# Patient Record
Sex: FEMALE | Race: WHITE | Employment: FULL TIME | URBAN - METROPOLITAN AREA
[De-identification: names, ages, dates, MRNs, and addresses within clinical notes are randomized per-mention and may not be internally consistent; named-entity substitution may affect disease eponyms.]

---

## 2019-08-07 RX ORDER — LANCETS 33 GAUGE
EACH MISCELLANEOUS
COMMUNITY
Start: 2015-11-09

## 2019-08-07 RX ORDER — ASPIRIN 81 MG/1
81 TABLET ORAL EVERY EVENING
Status: ON HOLD | COMMUNITY
Start: 2008-07-02 | End: 2019-08-13

## 2019-08-07 RX ORDER — CITALOPRAM HYDROBROMIDE 40 MG/1
20 TABLET ORAL EVERY EVENING
COMMUNITY
Start: 2019-06-11

## 2019-08-07 RX ORDER — COVID-19 ANTIGEN TEST
220 KIT MISCELLANEOUS 2 TIMES DAILY WITH MEALS
COMMUNITY

## 2019-08-07 RX ORDER — LOSARTAN POTASSIUM 25 MG/1
25 TABLET ORAL EVERY EVENING
COMMUNITY
Start: 2019-02-14

## 2019-08-07 RX ORDER — MAGNESIUM OXIDE 400 MG/1
400 TABLET ORAL EVERY EVENING
COMMUNITY
Start: 2019-02-14

## 2019-08-07 RX ORDER — CYCLOBENZAPRINE HCL 10 MG
10 TABLET ORAL 3 TIMES DAILY PRN
COMMUNITY
Start: 2019-03-04

## 2019-08-07 RX ORDER — HYDROCHLOROTHIAZIDE 25 MG/1
25 TABLET ORAL DAILY
COMMUNITY
Start: 2019-06-12

## 2019-08-07 RX ORDER — LEVOTHYROXINE SODIUM 112 UG/1
112 TABLET ORAL DAILY
COMMUNITY
Start: 2019-02-14

## 2019-08-07 RX ORDER — ATORVASTATIN CALCIUM 40 MG/1
40 TABLET, FILM COATED ORAL AT BEDTIME
COMMUNITY
Start: 2019-02-14

## 2019-08-09 ENCOUNTER — ANESTHESIA EVENT (OUTPATIENT)
Dept: SURGERY | Facility: CLINIC | Age: 69
DRG: 470 | End: 2019-08-09
Payer: MEDICARE

## 2019-08-12 ENCOUNTER — ANESTHESIA (OUTPATIENT)
Dept: SURGERY | Facility: CLINIC | Age: 69
DRG: 470 | End: 2019-08-12
Payer: MEDICARE

## 2019-08-12 ENCOUNTER — HOSPITAL ENCOUNTER (INPATIENT)
Facility: CLINIC | Age: 69
LOS: 2 days | Discharge: HOME | DRG: 470 | End: 2019-08-14
Attending: ORTHOPAEDIC SURGERY | Admitting: ORTHOPAEDIC SURGERY
Payer: MEDICARE

## 2019-08-12 DIAGNOSIS — Z96.651 STATUS POST TOTAL RIGHT KNEE REPLACEMENT: Primary | ICD-10-CM

## 2019-08-12 PROBLEM — M17.11 RIGHT KNEE DJD: Status: ACTIVE | Noted: 2019-08-12

## 2019-08-12 LAB
CREAT SERPL-MCNC: 1.04 MG/DL (ref 0.52–1.04)
GFR SERPL CREATININE-BSD FRML MDRD: 55 ML/MIN/{1.73_M2}
GLUCOSE BLDC GLUCOMTR-MCNC: 134 MG/DL (ref 70–99)
GLUCOSE BLDC GLUCOMTR-MCNC: 136 MG/DL (ref 70–99)
GLUCOSE BLDC GLUCOMTR-MCNC: 274 MG/DL (ref 70–99)
GLUCOSE BLDC GLUCOMTR-MCNC: 331 MG/DL (ref 70–99)
PLATELET # BLD AUTO: 240 10E9/L (ref 150–450)

## 2019-08-12 PROCEDURE — 27110028 ZZH OR GENERAL SUPPLY NON-STERILE: Performed by: ORTHOPAEDIC SURGERY

## 2019-08-12 PROCEDURE — 36000093 ZZH SURGERY LEVEL 4 1ST 30 MIN: Performed by: ORTHOPAEDIC SURGERY

## 2019-08-12 PROCEDURE — 85049 AUTOMATED PLATELET COUNT: CPT | Performed by: ORTHOPAEDIC SURGERY

## 2019-08-12 PROCEDURE — C1776 JOINT DEVICE (IMPLANTABLE): HCPCS | Performed by: ORTHOPAEDIC SURGERY

## 2019-08-12 PROCEDURE — 71000012 ZZH RECOVERY PHASE 1 LEVEL 1 FIRST HR: Performed by: ORTHOPAEDIC SURGERY

## 2019-08-12 PROCEDURE — 0SRC0J9 REPLACEMENT OF RIGHT KNEE JOINT WITH SYNTHETIC SUBSTITUTE, CEMENTED, OPEN APPROACH: ICD-10-PCS | Performed by: ORTHOPAEDIC SURGERY

## 2019-08-12 PROCEDURE — 12000000 ZZH R&B MED SURG/OB

## 2019-08-12 PROCEDURE — 25800030 ZZH RX IP 258 OP 636: Performed by: NURSE ANESTHETIST, CERTIFIED REGISTERED

## 2019-08-12 PROCEDURE — 36415 COLL VENOUS BLD VENIPUNCTURE: CPT | Performed by: ORTHOPAEDIC SURGERY

## 2019-08-12 PROCEDURE — 37000008 ZZH ANESTHESIA TECHNICAL FEE, 1ST 30 MIN: Performed by: ORTHOPAEDIC SURGERY

## 2019-08-12 PROCEDURE — 40000306 ZZH STATISTIC PRE PROC ASSESS II: Performed by: ORTHOPAEDIC SURGERY

## 2019-08-12 PROCEDURE — 37000009 ZZH ANESTHESIA TECHNICAL FEE, EACH ADDTL 15 MIN: Performed by: ORTHOPAEDIC SURGERY

## 2019-08-12 PROCEDURE — 25000128 H RX IP 250 OP 636: Performed by: PHYSICIAN ASSISTANT

## 2019-08-12 PROCEDURE — 25000128 H RX IP 250 OP 636: Performed by: ORTHOPAEDIC SURGERY

## 2019-08-12 PROCEDURE — 27810169 ZZH OR IMPLANT GENERAL: Performed by: ORTHOPAEDIC SURGERY

## 2019-08-12 PROCEDURE — 25000128 H RX IP 250 OP 636: Performed by: NURSE ANESTHETIST, CERTIFIED REGISTERED

## 2019-08-12 PROCEDURE — 25000125 ZZHC RX 250: Performed by: NURSE ANESTHETIST, CERTIFIED REGISTERED

## 2019-08-12 PROCEDURE — 27210794 ZZH OR GENERAL SUPPLY STERILE: Performed by: ORTHOPAEDIC SURGERY

## 2019-08-12 PROCEDURE — 25000132 ZZH RX MED GY IP 250 OP 250 PS 637: Mod: GY | Performed by: PHYSICIAN ASSISTANT

## 2019-08-12 PROCEDURE — 25000132 ZZH RX MED GY IP 250 OP 250 PS 637: Mod: GY | Performed by: ORTHOPAEDIC SURGERY

## 2019-08-12 PROCEDURE — 25800030 ZZH RX IP 258 OP 636: Performed by: ORTHOPAEDIC SURGERY

## 2019-08-12 PROCEDURE — 00000146 ZZHCL STATISTIC GLUCOSE BY METER IP

## 2019-08-12 PROCEDURE — 82565 ASSAY OF CREATININE: CPT | Performed by: ORTHOPAEDIC SURGERY

## 2019-08-12 PROCEDURE — 36000063 ZZH SURGERY LEVEL 4 EA 15 ADDTL MIN: Performed by: ORTHOPAEDIC SURGERY

## 2019-08-12 DEVICE — BONE CEMENT DEPUY FAST SET 20GM CMW2: Type: IMPLANTABLE DEVICE | Site: KNEE | Status: FUNCTIONAL

## 2019-08-12 DEVICE — IMPLANTABLE DEVICE: Type: IMPLANTABLE DEVICE | Site: KNEE | Status: FUNCTIONAL

## 2019-08-12 DEVICE — IMP COMP FEM JJ ATTUNE CR RT CEM SZ5 150400205: Type: IMPLANTABLE DEVICE | Site: KNEE | Status: FUNCTIONAL

## 2019-08-12 RX ORDER — AMOXICILLIN 250 MG
1 CAPSULE ORAL 2 TIMES DAILY
Status: DISCONTINUED | OUTPATIENT
Start: 2019-08-12 | End: 2019-08-14 | Stop reason: HOSPADM

## 2019-08-12 RX ORDER — ACETAMINOPHEN 325 MG/1
650 TABLET ORAL EVERY 4 HOURS PRN
Status: DISCONTINUED | OUTPATIENT
Start: 2019-08-15 | End: 2019-08-14 | Stop reason: HOSPADM

## 2019-08-12 RX ORDER — EPINEPHRINE 1 MG/ML
INJECTION, SOLUTION, CONCENTRATE INTRAVENOUS PRN
Status: DISCONTINUED | OUTPATIENT
Start: 2019-08-12 | End: 2019-08-12

## 2019-08-12 RX ORDER — GLUCOSAMINE HCL/CHONDROITIN SU 500-400 MG
CAPSULE ORAL 3 TIMES DAILY
COMMUNITY

## 2019-08-12 RX ORDER — NALOXONE HYDROCHLORIDE 0.4 MG/ML
.1-.4 INJECTION, SOLUTION INTRAMUSCULAR; INTRAVENOUS; SUBCUTANEOUS
Status: DISCONTINUED | OUTPATIENT
Start: 2019-08-12 | End: 2019-08-12

## 2019-08-12 RX ORDER — CITALOPRAM HYDROBROMIDE 20 MG/1
20 TABLET ORAL EVERY EVENING
Status: DISCONTINUED | OUTPATIENT
Start: 2019-08-12 | End: 2019-08-14 | Stop reason: HOSPADM

## 2019-08-12 RX ORDER — HYDROMORPHONE HYDROCHLORIDE 1 MG/ML
.3-.5 INJECTION, SOLUTION INTRAMUSCULAR; INTRAVENOUS; SUBCUTANEOUS
Status: DISCONTINUED | OUTPATIENT
Start: 2019-08-12 | End: 2019-08-14 | Stop reason: HOSPADM

## 2019-08-12 RX ORDER — EPHEDRINE SULFATE 50 MG/ML
INJECTION, SOLUTION INTRAMUSCULAR; INTRAVENOUS; SUBCUTANEOUS PRN
Status: DISCONTINUED | OUTPATIENT
Start: 2019-08-12 | End: 2019-08-12

## 2019-08-12 RX ORDER — SODIUM CHLORIDE, SODIUM LACTATE, POTASSIUM CHLORIDE, CALCIUM CHLORIDE 600; 310; 30; 20 MG/100ML; MG/100ML; MG/100ML; MG/100ML
INJECTION, SOLUTION INTRAVENOUS CONTINUOUS
Status: DISCONTINUED | OUTPATIENT
Start: 2019-08-12 | End: 2019-08-12 | Stop reason: HOSPADM

## 2019-08-12 RX ORDER — FENTANYL CITRATE 50 UG/ML
25-50 INJECTION, SOLUTION INTRAMUSCULAR; INTRAVENOUS
Status: DISCONTINUED | OUTPATIENT
Start: 2019-08-12 | End: 2019-08-12 | Stop reason: HOSPADM

## 2019-08-12 RX ORDER — PROPOFOL 10 MG/ML
INJECTION, EMULSION INTRAVENOUS CONTINUOUS PRN
Status: DISCONTINUED | OUTPATIENT
Start: 2019-08-12 | End: 2019-08-12

## 2019-08-12 RX ORDER — SODIUM CHLORIDE, SODIUM LACTATE, POTASSIUM CHLORIDE, CALCIUM CHLORIDE 600; 310; 30; 20 MG/100ML; MG/100ML; MG/100ML; MG/100ML
INJECTION, SOLUTION INTRAVENOUS CONTINUOUS
Status: DISCONTINUED | OUTPATIENT
Start: 2019-08-12 | End: 2019-08-13

## 2019-08-12 RX ORDER — ONDANSETRON 2 MG/ML
4 INJECTION INTRAMUSCULAR; INTRAVENOUS EVERY 30 MIN PRN
Status: DISCONTINUED | OUTPATIENT
Start: 2019-08-12 | End: 2019-08-12 | Stop reason: HOSPADM

## 2019-08-12 RX ORDER — BUPIVACAINE HYDROCHLORIDE 7.5 MG/ML
INJECTION, SOLUTION INTRASPINAL PRN
Status: DISCONTINUED | OUTPATIENT
Start: 2019-08-12 | End: 2019-08-12

## 2019-08-12 RX ORDER — HYDROCHLOROTHIAZIDE 25 MG/1
25 TABLET ORAL DAILY
Status: DISCONTINUED | OUTPATIENT
Start: 2019-08-13 | End: 2019-08-14 | Stop reason: HOSPADM

## 2019-08-12 RX ORDER — LIDOCAINE HYDROCHLORIDE 10 MG/ML
INJECTION, SOLUTION INFILTRATION; PERINEURAL PRN
Status: DISCONTINUED | OUTPATIENT
Start: 2019-08-12 | End: 2019-08-12

## 2019-08-12 RX ORDER — LEVOTHYROXINE SODIUM 112 UG/1
112 TABLET ORAL DAILY
Status: DISCONTINUED | OUTPATIENT
Start: 2019-08-13 | End: 2019-08-14 | Stop reason: HOSPADM

## 2019-08-12 RX ORDER — ACETAMINOPHEN 325 MG/1
975 TABLET ORAL EVERY 8 HOURS
Status: DISCONTINUED | OUTPATIENT
Start: 2019-08-12 | End: 2019-08-14 | Stop reason: HOSPADM

## 2019-08-12 RX ORDER — LIDOCAINE HYDROCHLORIDE AND EPINEPHRINE BITARTRATE 20; .01 MG/ML; MG/ML
INJECTION, SOLUTION SUBCUTANEOUS PRN
Status: DISCONTINUED | OUTPATIENT
Start: 2019-08-12 | End: 2019-08-12

## 2019-08-12 RX ORDER — KETOROLAC TROMETHAMINE 30 MG/ML
INJECTION, SOLUTION INTRAMUSCULAR; INTRAVENOUS PRN
Status: DISCONTINUED | OUTPATIENT
Start: 2019-08-12 | End: 2019-08-12

## 2019-08-12 RX ORDER — AMOXICILLIN 250 MG
2 CAPSULE ORAL 2 TIMES DAILY
Status: DISCONTINUED | OUTPATIENT
Start: 2019-08-12 | End: 2019-08-14 | Stop reason: HOSPADM

## 2019-08-12 RX ORDER — ONDANSETRON 4 MG/1
4 TABLET, ORALLY DISINTEGRATING ORAL EVERY 6 HOURS PRN
Status: DISCONTINUED | OUTPATIENT
Start: 2019-08-12 | End: 2019-08-14 | Stop reason: HOSPADM

## 2019-08-12 RX ORDER — ONDANSETRON 4 MG/1
4 TABLET, ORALLY DISINTEGRATING ORAL EVERY 30 MIN PRN
Status: DISCONTINUED | OUTPATIENT
Start: 2019-08-12 | End: 2019-08-12 | Stop reason: HOSPADM

## 2019-08-12 RX ORDER — KETAMINE HYDROCHLORIDE 10 MG/ML
INJECTION, SOLUTION INTRAMUSCULAR; INTRAVENOUS PRN
Status: DISCONTINUED | OUTPATIENT
Start: 2019-08-12 | End: 2019-08-12

## 2019-08-12 RX ORDER — LIDOCAINE 40 MG/G
CREAM TOPICAL
Status: DISCONTINUED | OUTPATIENT
Start: 2019-08-12 | End: 2019-08-14 | Stop reason: HOSPADM

## 2019-08-12 RX ORDER — ATORVASTATIN CALCIUM 20 MG/1
40 TABLET, FILM COATED ORAL AT BEDTIME
Status: DISCONTINUED | OUTPATIENT
Start: 2019-08-12 | End: 2019-08-14 | Stop reason: HOSPADM

## 2019-08-12 RX ORDER — DIMENHYDRINATE 50 MG/ML
25 INJECTION, SOLUTION INTRAMUSCULAR; INTRAVENOUS
Status: DISCONTINUED | OUTPATIENT
Start: 2019-08-12 | End: 2019-08-12 | Stop reason: HOSPADM

## 2019-08-12 RX ORDER — DEXTROSE MONOHYDRATE 25 G/50ML
25-50 INJECTION, SOLUTION INTRAVENOUS
Status: DISCONTINUED | OUTPATIENT
Start: 2019-08-12 | End: 2019-08-14 | Stop reason: HOSPADM

## 2019-08-12 RX ORDER — ROPIVACAINE HYDROCHLORIDE 5 MG/ML
INJECTION, SOLUTION EPIDURAL; INFILTRATION; PERINEURAL PRN
Status: DISCONTINUED | OUTPATIENT
Start: 2019-08-12 | End: 2019-08-12

## 2019-08-12 RX ORDER — CEFAZOLIN SODIUM 2 G/100ML
2 INJECTION, SOLUTION INTRAVENOUS EVERY 8 HOURS
Status: COMPLETED | OUTPATIENT
Start: 2019-08-12 | End: 2019-08-13

## 2019-08-12 RX ORDER — DEXAMETHASONE SODIUM PHOSPHATE 4 MG/ML
4 INJECTION, SOLUTION INTRA-ARTICULAR; INTRALESIONAL; INTRAMUSCULAR; INTRAVENOUS; SOFT TISSUE
Status: DISCONTINUED | OUTPATIENT
Start: 2019-08-12 | End: 2019-08-12 | Stop reason: HOSPADM

## 2019-08-12 RX ORDER — HYDROXYZINE HYDROCHLORIDE 50 MG/1
50 TABLET, FILM COATED ORAL EVERY 6 HOURS PRN
Status: DISCONTINUED | OUTPATIENT
Start: 2019-08-12 | End: 2019-08-12 | Stop reason: HOSPADM

## 2019-08-12 RX ORDER — CEFAZOLIN SODIUM 2 G/100ML
2 INJECTION, SOLUTION INTRAVENOUS
Status: COMPLETED | OUTPATIENT
Start: 2019-08-12 | End: 2019-08-12

## 2019-08-12 RX ORDER — LIDOCAINE 40 MG/G
CREAM TOPICAL
Status: DISCONTINUED | OUTPATIENT
Start: 2019-08-12 | End: 2019-08-12 | Stop reason: HOSPADM

## 2019-08-12 RX ORDER — DEXAMETHASONE SODIUM PHOSPHATE 4 MG/ML
INJECTION, SOLUTION INTRA-ARTICULAR; INTRALESIONAL; INTRAMUSCULAR; INTRAVENOUS; SOFT TISSUE PRN
Status: DISCONTINUED | OUTPATIENT
Start: 2019-08-12 | End: 2019-08-12

## 2019-08-12 RX ORDER — PROCHLORPERAZINE MALEATE 5 MG
5 TABLET ORAL EVERY 6 HOURS PRN
Status: DISCONTINUED | OUTPATIENT
Start: 2019-08-12 | End: 2019-08-14 | Stop reason: HOSPADM

## 2019-08-12 RX ORDER — GABAPENTIN 100 MG/1
100 CAPSULE ORAL 3 TIMES DAILY
Status: DISCONTINUED | OUTPATIENT
Start: 2019-08-12 | End: 2019-08-14 | Stop reason: HOSPADM

## 2019-08-12 RX ORDER — HYDROXYZINE HYDROCHLORIDE 25 MG/1
25 TABLET, FILM COATED ORAL EVERY 6 HOURS PRN
Status: DISCONTINUED | OUTPATIENT
Start: 2019-08-12 | End: 2019-08-12 | Stop reason: HOSPADM

## 2019-08-12 RX ORDER — CEFAZOLIN SODIUM 1 G/3ML
1 INJECTION, POWDER, FOR SOLUTION INTRAMUSCULAR; INTRAVENOUS SEE ADMIN INSTRUCTIONS
Status: DISCONTINUED | OUTPATIENT
Start: 2019-08-12 | End: 2019-08-12 | Stop reason: HOSPADM

## 2019-08-12 RX ORDER — NALOXONE HYDROCHLORIDE 0.4 MG/ML
.1-.4 INJECTION, SOLUTION INTRAMUSCULAR; INTRAVENOUS; SUBCUTANEOUS
Status: DISCONTINUED | OUTPATIENT
Start: 2019-08-12 | End: 2019-08-14 | Stop reason: HOSPADM

## 2019-08-12 RX ORDER — MEPERIDINE HYDROCHLORIDE 25 MG/ML
12.5 INJECTION INTRAMUSCULAR; INTRAVENOUS; SUBCUTANEOUS EVERY 5 MIN PRN
Status: DISCONTINUED | OUTPATIENT
Start: 2019-08-12 | End: 2019-08-12 | Stop reason: HOSPADM

## 2019-08-12 RX ORDER — GABAPENTIN 100 MG/1
100 CAPSULE ORAL
Status: COMPLETED | OUTPATIENT
Start: 2019-08-12 | End: 2019-08-12

## 2019-08-12 RX ORDER — ONDANSETRON 2 MG/ML
4 INJECTION INTRAMUSCULAR; INTRAVENOUS EVERY 6 HOURS PRN
Status: DISCONTINUED | OUTPATIENT
Start: 2019-08-12 | End: 2019-08-14 | Stop reason: HOSPADM

## 2019-08-12 RX ORDER — FENTANYL CITRATE 50 UG/ML
INJECTION, SOLUTION INTRAMUSCULAR; INTRAVENOUS PRN
Status: DISCONTINUED | OUTPATIENT
Start: 2019-08-12 | End: 2019-08-12

## 2019-08-12 RX ORDER — NICOTINE POLACRILEX 4 MG
15-30 LOZENGE BUCCAL
Status: DISCONTINUED | OUTPATIENT
Start: 2019-08-12 | End: 2019-08-14 | Stop reason: HOSPADM

## 2019-08-12 RX ORDER — HYDROXYZINE HYDROCHLORIDE 10 MG/1
10 TABLET, FILM COATED ORAL EVERY 6 HOURS PRN
Status: DISCONTINUED | OUTPATIENT
Start: 2019-08-12 | End: 2019-08-14 | Stop reason: HOSPADM

## 2019-08-12 RX ORDER — LOSARTAN POTASSIUM 25 MG/1
25 TABLET ORAL EVERY EVENING
Status: DISCONTINUED | OUTPATIENT
Start: 2019-08-12 | End: 2019-08-14 | Stop reason: HOSPADM

## 2019-08-12 RX ORDER — ONDANSETRON 2 MG/ML
INJECTION INTRAMUSCULAR; INTRAVENOUS PRN
Status: DISCONTINUED | OUTPATIENT
Start: 2019-08-12 | End: 2019-08-12

## 2019-08-12 RX ORDER — HYDROMORPHONE HYDROCHLORIDE 2 MG/1
2-4 TABLET ORAL EVERY 4 HOURS PRN
Status: DISCONTINUED | OUTPATIENT
Start: 2019-08-12 | End: 2019-08-14 | Stop reason: HOSPADM

## 2019-08-12 RX ADMIN — Medication 5 ML: at 13:36

## 2019-08-12 RX ADMIN — SODIUM CHLORIDE, POTASSIUM CHLORIDE, SODIUM LACTATE AND CALCIUM CHLORIDE: 600; 310; 30; 20 INJECTION, SOLUTION INTRAVENOUS at 21:50

## 2019-08-12 RX ADMIN — Medication 7.5 MG: at 13:03

## 2019-08-12 RX ADMIN — EPINEPHRINE 0.2 MG: 1 INJECTION, SOLUTION INTRAMUSCULAR; SUBCUTANEOUS at 12:17

## 2019-08-12 RX ADMIN — LIDOCAINE HYDROCHLORIDE 1 ML: 10 INJECTION, SOLUTION EPIDURAL; INFILTRATION; INTRACAUDAL; PERINEURAL at 11:01

## 2019-08-12 RX ADMIN — ACETAMINOPHEN 975 MG: 325 TABLET, FILM COATED ORAL at 16:06

## 2019-08-12 RX ADMIN — LIDOCAINE HYDROCHLORIDE 100 MG: 10 INJECTION, SOLUTION INFILTRATION; PERINEURAL at 12:20

## 2019-08-12 RX ADMIN — FENTANYL CITRATE 100 MCG: 50 INJECTION, SOLUTION INTRAMUSCULAR; INTRAVENOUS at 12:11

## 2019-08-12 RX ADMIN — CEFAZOLIN SODIUM 2 G: 2 INJECTION, SOLUTION INTRAVENOUS at 20:43

## 2019-08-12 RX ADMIN — BUPIVACAINE HYDROCHLORIDE IN DEXTROSE 1.6 ML: 7.5 INJECTION, SOLUTION SUBARACHNOID at 12:17

## 2019-08-12 RX ADMIN — ATORVASTATIN CALCIUM 40 MG: 20 TABLET, FILM COATED ORAL at 21:19

## 2019-08-12 RX ADMIN — SODIUM CHLORIDE, POTASSIUM CHLORIDE, SODIUM LACTATE AND CALCIUM CHLORIDE: 600; 310; 30; 20 INJECTION, SOLUTION INTRAVENOUS at 16:07

## 2019-08-12 RX ADMIN — SODIUM CHLORIDE, POTASSIUM CHLORIDE, SODIUM LACTATE AND CALCIUM CHLORIDE: 600; 310; 30; 20 INJECTION, SOLUTION INTRAVENOUS at 11:01

## 2019-08-12 RX ADMIN — GABAPENTIN 100 MG: 100 CAPSULE ORAL at 20:43

## 2019-08-12 RX ADMIN — KETOROLAC TROMETHAMINE 30 MG: 30 INJECTION, SOLUTION INTRAMUSCULAR at 13:02

## 2019-08-12 RX ADMIN — LOSARTAN POTASSIUM 25 MG: 25 TABLET ORAL at 21:20

## 2019-08-12 RX ADMIN — SENNOSIDES AND DOCUSATE SODIUM 1 TABLET: 8.6; 5 TABLET ORAL at 20:43

## 2019-08-12 RX ADMIN — ONDANSETRON 4 MG: 2 INJECTION INTRAMUSCULAR; INTRAVENOUS at 12:10

## 2019-08-12 RX ADMIN — DEXAMETHASONE SODIUM PHOSPHATE 10 MG: 4 INJECTION, SOLUTION INTRA-ARTICULAR; INTRALESIONAL; INTRAMUSCULAR; INTRAVENOUS; SOFT TISSUE at 12:20

## 2019-08-12 RX ADMIN — HYDROMORPHONE HYDROCHLORIDE 2 MG: 2 TABLET ORAL at 18:20

## 2019-08-12 RX ADMIN — GABAPENTIN 100 MG: 100 CAPSULE ORAL at 11:02

## 2019-08-12 RX ADMIN — HYDROMORPHONE HYDROCHLORIDE 4 MG: 2 TABLET ORAL at 23:18

## 2019-08-12 RX ADMIN — PROPOFOL 100 MCG/KG/MIN: 10 INJECTION, EMULSION INTRAVENOUS at 12:20

## 2019-08-12 RX ADMIN — CEFAZOLIN SODIUM 2 G: 2 INJECTION, SOLUTION INTRAVENOUS at 12:05

## 2019-08-12 RX ADMIN — HYDROMORPHONE HYDROCHLORIDE 0.5 MG: 1 INJECTION, SOLUTION INTRAMUSCULAR; INTRAVENOUS; SUBCUTANEOUS at 21:20

## 2019-08-12 RX ADMIN — METFORMIN HYDROCHLORIDE 1000 MG: 500 TABLET ORAL at 21:20

## 2019-08-12 RX ADMIN — CITALOPRAM HYDROBROMIDE 20 MG: 20 TABLET ORAL at 21:20

## 2019-08-12 RX ADMIN — Medication 5 MG: at 12:36

## 2019-08-12 RX ADMIN — ROPIVACAINE HYDROCHLORIDE 15 ML: 5 INJECTION, SOLUTION EPIDURAL; INFILTRATION; PERINEURAL at 13:38

## 2019-08-12 RX ADMIN — Medication 5 MG: at 12:44

## 2019-08-12 RX ADMIN — HYDROMORPHONE HYDROCHLORIDE 2 MG: 2 TABLET ORAL at 16:38

## 2019-08-12 RX ADMIN — MIDAZOLAM 2 MG: 1 INJECTION INTRAMUSCULAR; INTRAVENOUS at 12:05

## 2019-08-12 RX ADMIN — SODIUM CHLORIDE, POTASSIUM CHLORIDE, SODIUM LACTATE AND CALCIUM CHLORIDE: 600; 310; 30; 20 INJECTION, SOLUTION INTRAVENOUS at 13:00

## 2019-08-12 RX ADMIN — KETAMINE HYDROCHLORIDE 50 MG: 10 INJECTION INTRAMUSCULAR; INTRAVENOUS at 12:20

## 2019-08-12 RX ADMIN — GABAPENTIN 100 MG: 100 CAPSULE ORAL at 16:06

## 2019-08-12 ASSESSMENT — ACTIVITIES OF DAILY LIVING (ADL)
ADLS_ACUITY_SCORE: 11
ADLS_ACUITY_SCORE: 11

## 2019-08-12 ASSESSMENT — MIFFLIN-ST. JEOR
SCORE: 1417
SCORE: 1384.21

## 2019-08-12 ASSESSMENT — LIFESTYLE VARIABLES: TOBACCO_USE: 1

## 2019-08-12 NOTE — ANESTHESIA PROCEDURE NOTES
Peripheral nerve/Neuraxial procedure note : Adductor canal  Pre-Procedure  Performed by  Diane Rice APRN CRNA   Location: post-op      Pre-Anesthestic Checklist: patient identified, IV checked, site marked, risks and benefits discussed, informed consent, monitors and equipment checked, pre-op evaluation, at physician/surgeon's request and post-op pain management    Timeout  Correct Patient: Yes   Correct Procedure: Yes   Correct Site: Yes   Correct Laterality: Yes   Correct Position: Yes   Site Marked: Yes   .   Procedure Documentation    .    Procedure:  right  Adductor canal.     Ultrasound used to identify targeted nerve, plexus, or vascular marker and placed a needle adjacent to it., Ultrasound was used to visualize the spread of the anesthetic in close proximity to the above stated nerve. A permanent image is entered into the patient's record.  Patient Prep;mask, sterile gloves, patient draped.  .  Needle: insulated Needle Gauge: 22.    Needle Length (Inches) 4  Insertion Method: Single Shot.       Assessment/Narrative  Paresthesias: No.  .  The placement was negative for: blood aspirated, painful injection and site bleeding.  Bolus given via needle..   Secured via.   Complications: none. Test dose of 5 mL lidocaine 2% w/ 1:200,000 epinephrine at 13:36.  Test dose negative for signs of intravascular, subdural or intrathecal injection.

## 2019-08-12 NOTE — BRIEF OP NOTE
[facility]    Brief Operative Note    Pre-operative diagnosis: right knee osteoarthritis  Post-operative diagnosis Primary Osteoarthrosis Right Knee  Procedure: Procedure(s):  Total Knee Arthroplasty  Surgeon: Surgeon(s) and Role:     * Girish Dean MD - Primary     * Sebas Simmons PA-C - Assisting  Anesthesia: Spinal   Estimated blood loss: Minimal  Drains: Hemovac  Specimens: * No specimens in log *  Findings:   None.  Complications: None.  Implants:    Implant Name Type Inv. Item Serial No.  Lot No. LRB No. Used   BONE CEMENT DEPUY FAST SET 20GM CMW2 Cement, Bone BONE CEMENT DEPUY FAST SET 20GM CMW2  [employer] [id number] Right 1   BONE CEMENT DEPUY FAST SET 20GM CMW2 Cement, Bone BONE CEMENT DEPUY FAST SET 20GM CMW2  [employer] [id number] Right 1   IMP PATELLA JJ ATTUNE DOME 38MM [id number] Total Joint Component/Insert IMP PATELLA JJ ATTUNE DOME 38MM [id number]  [employer] [facility] [id number] Right 1   Attune Tibial Base Rotating Platform size 5 cemented     [id number] Right 1   IMP COMP FEM JJ ATTUNE CR RT SUKUMAR SZ5 [id number] Total Joint Component/Insert IMP COMP FEM JJ ATTUNE CR RT SUKUMAR SZ5 [id number]  [employer] [id number] Right 1   IMP INSERT JJ ATTUNE CR RP SZ5 12MM [id number] Total Joint Component/Insert IMP INSERT JJ ATTUNE CR RP SZ5 12MM [id number]  [employer] [facility]   [id number] Right 1

## 2019-08-12 NOTE — ANESTHESIA PROCEDURE NOTES
Peripheral nerve/Neuraxial procedure note : intrathecal  Pre-Procedure  Performed by  Diane Rice APRN CRNA   Location: OR      Pre-Anesthestic Checklist: patient identified, IV checked, risks and benefits discussed, informed consent, monitors and equipment checked and pre-op evaluation    Timeout  Correct Patient: Yes   Correct Procedure: Yes   Correct Site: Yes   Correct Laterality: N/A   Correct Position: Yes   Site Marked: N/A   .   Procedure Documentation  ASA 3  .    Procedure:    Intrathecal.  Insertion Site:L3-4  (midline approach)      Patient Prep;mask, sterile gloves, povidone-iodine 7.5% surgical scrub, patient draped.  .  Needle: Veronica tip Spinal Needle (gauge): 25  Spinal/LP Needle Length (inches): 3.5 # of attempts: 1 and  # of redirects:  3 Introducer used Introducer: 20 G .       Assessment/Narrative  .  .  clear CSF fluid removed while sitting   . Time Injected: 12:17

## 2019-08-12 NOTE — ANESTHESIA POSTPROCEDURE EVALUATION
Patient: Randee Bey    Procedure(s):  Total Knee Arthroplasty    Diagnosis:right knee osteoarthritis  Diagnosis Additional Information: No value filed.    Anesthesia Type:  Spinal, Periph. Nerve Block for postop pain    Note:  Anesthesia Post Evaluation    Patient location during evaluation: PACU and Bedside  Patient participation: Able to participate in evaluation but full recovery from regional anesthesia has not yet ocurrred but is anticipated to occur within 48 hours  Level of consciousness: awake and alert  Pain management: adequate  Airway patency: patent  Cardiovascular status: acceptable and hemodynamically stable  Respiratory status: acceptable and room air  Hydration status: acceptable  PONV: none     Anesthetic complications: None          Last vitals:  Vitals:    08/12/19 1323 08/12/19 1330 08/12/19 1345   BP: 138/66 125/69 133/70   Pulse:      Resp: 16 15 13   Temp: 36.6  C (97.8  F)     SpO2: 98% 97% 97%         Electronically Signed By: TRUMAN Salazar CRNA  August 12, 2019  1:48 PM

## 2019-08-12 NOTE — ANESTHESIA PREPROCEDURE EVALUATION
Anesthesia Pre-Procedure Evaluation    Patient: Randee Bey   MRN: [id number] : 1950          Preoperative Diagnosis: right knee osteoarthritis    Procedure(s):  Total Knee Arthroplasty    No past medical history on file.  No past surgical history on file.    Anesthesia Evaluation     . Pt has had prior anesthetic. Type: General and MAC           ROS/MED HX    ENT/Pulmonary:     (+)tobacco use, Past use Intermittent asthma Treatment: Inhaler prn,  , . .    Neurologic:  - neg neurologic ROS     Cardiovascular:     (+) Dyslipidemia, hypertension----. : . . . :. . Previous cardiac testing date:results:date: results:ECG reviewed date:19 results:Sinus bradycardia   date: results:          METS/Exercise Tolerance:     Hematologic:  - neg hematologic  ROS       Musculoskeletal:   (+) arthritis,  other musculoskeletal- Cervicalgia; DDD      GI/Hepatic:     (+) Inflammatory bowel disease,       Renal/Genitourinary:  - ROS Renal section negative       Endo:     (+) type II DM Using insulin thyroid problem hypothyroidism, Obesity, .      Psychiatric:     (+) psychiatric history depression      Infectious Disease:  - neg infectious disease ROS       Malignancy:      - no malignancy   Other:    - neg other ROS                      Physical Exam  Normal systems: cardiovascular, pulmonary and dental    Airway   Mallampati: III  TM distance: >3 FB  Neck ROM: full    Dental     Cardiovascular       Pulmonary             No results found for: WBC, HGB, HCT, PLT, CRP, SED, NA, POTASSIUM, CHLORIDE, CO2, BUN, CR, GLC, ESTHELA, PHOS, MAG, ALBUMIN, PROTTOTAL, ALT, AST, GGT, ALKPHOS, BILITOTAL, BILIDIRECT, LIPASE, AMYLASE, BRAULIO, PTT, INR, FIBR, TSH, T4, T3, HCG, HCGS, CKTOTAL, CKMB, TROPN    Preop Vitals  BP Readings from Last 3 Encounters:   No data found for BP    Pulse Readings from Last 3 Encounters:   No data found for Pulse      Resp Readings from Last 3 Encounters:   No data found for Resp    SpO2 Readings from Last 3  Encounters:   No data found for SpO2      Temp Readings from Last 1 Encounters:   No data found for Temp    Ht Readings from Last 1 Encounters:   No data found for Ht      Wt Readings from Last 1 Encounters:   No data found for Wt    There is no height or weight on file to calculate BMI.       Anesthesia Plan      History & Physical Review  History and physical reviewed and following examination; no interval change.    ASA Status:  3 .    NPO Status:  > 6 hours    Plan for Spinal and Periph. Nerve Block for postop pain with Propofol induction. Maintenance will be TIVA.    PONV prophylaxis:  Ondansetron (or other 5HT-3) and Dexamethasone or Solumedrol       Postoperative Care  Postoperative pain management:  Multi-modal analgesia.      Consents  Anesthetic plan, risks, benefits and alternatives discussed with:  Patient..                 TRUMAN Salazar CRNA

## 2019-08-12 NOTE — PLAN OF CARE
"[facility] [place] ADMISSION NOTE    Patient admitted to room 2302 at approximately 1450 via cart from surgery. Patient was accompanied by transport tech.     Verbal SBAR report received from RN prior to patient arrival.     Patient trasferred to bed via air melody. Patient alert and oriented X 3. The patient is not having any pain. 0-10 Pain Scale: 0. Admission vital signs: Blood pressure 134/49, pulse 66, temperature 98  F (36.7  C), temperature source Oral, resp. rate 16, height 1.626 m (5' 4\"), weight 90.2 kg (198 lb 13.7 oz), SpO2 94 %. Patient was oriented to plan of care, call light, bed controls, tv, telephone, bathroom and visiting hours.     Risk Assessment    The following safety risks were identified during admission: none. Yellow risk band applied: NO.     Skin Initial Assessment    Patient declined full skin assessment, no noted rashes or other skin issues.         Ness Poon"

## 2019-08-12 NOTE — PROGRESS NOTES
"SPIRITUAL HEALTH SERVICES  SPIRITUAL ASSESSMENT [place] Note  [facility] [place]    Randee had requested  visit during pre-op admission phone call.    Son, Kevon, was present as Randee was prepped and waiting for her first joint replacement, a TKA with Dr Jordan.  We shared prayer together for both the surgery and the healing process following.  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        Kevon Ng M.A., [facility]  Staff   [facility]  Office: [phone]  Cell: [phone]  Pager [phone]    "

## 2019-08-12 NOTE — ANESTHESIA CARE TRANSFER NOTE
Patient: Randee Bey    Procedure(s):  Total Knee Arthroplasty    Diagnosis: right knee osteoarthritis  Diagnosis Additional Information: No value filed.    Anesthesia Type:   Spinal, Periph. Nerve Block for postop pain     Note:  Airway :Face Mask  Patient transferred to:PACU  Handoff Report: Identifed the Patient, Identified the Reponsible Provider, Reviewed the pertinent medical history, Discussed the surgical course, Reviewed Intra-OP anesthesia mangement and issues during anesthesia, Set expectations for post-procedure period and Allowed opportunity for questions and acknowledgement of understanding      Vitals: (Last set prior to Anesthesia Care Transfer)    CRNA VITALS  8/12/2019 1248 - 8/12/2019 1320      8/12/2019             Pulse:  67    SpO2:  99 %                Electronically Signed By: TRUMAN Salazar CRNA  August 12, 2019  1:20 PM

## 2019-08-12 NOTE — OP NOTE
Procedure Date: 08/12/2019      PREOPERATIVE DIAGNOSIS:  Primary osteoarthrosis, right knee.      POSTOPERATIVE DIAGNOSIS:  Primary osteoarthrosis, right knee.      PROCEDURE:  DePuy Attune right total knee arthroplasty.      COMPONENTS:   1.  Femur, #5 cruciate-retaining retaining, cemented.   2.  Tibia, #5 mobile-bearing tray, cemented.   3.  Insert,  #12.   4.  Patella,  38 mm.      SURGEON:  Girish Dean MD      ASSISTANT:  Sebas Queen PA-C      ANESTHESIA:  Spinal.      ESTIMATED BLOOD LOSS:  Minimal.      TOURNIQUET TIME:  Approximately 45 minutes at 300 mmHg.      COMPLICATIONS:  None apparent.      INDICATIONS:  Randee is a 68-year-old female who presented with severe arthrosis of the right knee recalcitrant to conservative treatment.  After alternatives, risks, and possible complications were carefully discussed, the patient desired surgical intervention and, therefore, consent was obtained.      OPERATION:  The patient was brought to main operating room after spinal anesthesia was obtained, positioned supine.  Tourniquet was placed on the right proximal thigh, 2 grams of Ancef was given intravenously and right lower extremity was prepped and draped in the usual sterile fashion.  The limb was elevated and tourniquet inflated.      A linear longitudinal incision was made for an anterior approach to the right knee.  Subcutaneous tissue was divided sharply.  A paramedian arthrotomy was made.  Large effusion was evacuated.  I then resected the undersurface of the patella with an oscillating saw, removed bony fragments, placed lug holes for a 38 mm insert.  Metal tray was placed over the cut surfaces as I retracted the patella laterally.      With the knee in flexion and retractors in place, I cut the distal femur with the assistance of an intramedullary guide to fit a #5 femoral component.  An extramedullary guide was used to assist with cutting the proximal tibial plateau.  Bony fragment was excised.  I  then utilized the laminar  to assist me in resection of the remains of the meniscus and posterior osteophytes and debris was removed.     With trial components in place, no soft tissue releases were required.  We did have excellent range of motion and good stability.  Therefore, we corrected for rotation, made cruciform cuts in the proximal tibial plateau and removed trial components.   While I mixed a batch of bone cement on the back table, we pulse lavaged the surfaces clean and meticulously dried them.  We then sequentially cemented the tibial tray, femoral component and patella.  An 12 mm trial insert was placed in the knee and brought out in extension.  Axial compression was held while I removed excess cement in the doughy stage.   Once the cement had solidified, we removed the trial spacer, removed the patellar clamp, pulse lavaged surfaces clean and removed excess bone and bone cement debris.  We again performed trial reduction and I decided to go with a 12 mm insert, which was loaded into place.  Once again, we had full extension, full flexion, stable to varus and valgus stress throughout the arc of motion.  The patella tracked centrally.  No spinout was present.  Therefore, we soaked the knee in weak Betadine solution, pulse lavaged all surfaces clean and placed a drain deep within the knee.     I then closed the deep fascia with #1 Stratafix, closed the subcutaneous tissue with 2-0 Stratafix and completed closure with 3-0 Stratafix.  Prineo was placed over the wound followed by a sterile compression bandage.  Tourniquet was deflated.     The patient was returned to PAR in stable condition, without apparent complication.         CHRISTY PENA MD             D: 2019   T: 2019   MT: BRITTANY      Name:     JOAN MICHELE   MRN:      [id number]        Account:        [id number]   :      1950           Procedure Date: 2019      Document: [id number]

## 2019-08-13 ENCOUNTER — APPOINTMENT (OUTPATIENT)
Dept: PHYSICAL THERAPY | Facility: CLINIC | Age: 69
DRG: 470 | End: 2019-08-13
Attending: ORTHOPAEDIC SURGERY
Payer: MEDICARE

## 2019-08-13 PROBLEM — E11.9 DIABETES MELLITUS, TYPE 2 (H): Status: ACTIVE | Noted: 2019-08-13

## 2019-08-13 PROBLEM — E78.5 HYPERLIPIDEMIA: Status: ACTIVE | Noted: 2019-08-13

## 2019-08-13 PROBLEM — F32.A DEPRESSION: Status: ACTIVE | Noted: 2019-08-13

## 2019-08-13 PROBLEM — E03.9 HYPOTHYROIDISM: Status: ACTIVE | Noted: 2019-08-13

## 2019-08-13 PROBLEM — I10 BENIGN ESSENTIAL HYPERTENSION: Status: ACTIVE | Noted: 2019-08-13

## 2019-08-13 LAB
GLUCOSE BLDC GLUCOMTR-MCNC: 101 MG/DL (ref 70–99)
GLUCOSE BLDC GLUCOMTR-MCNC: 138 MG/DL (ref 70–99)
GLUCOSE BLDC GLUCOMTR-MCNC: 151 MG/DL (ref 70–99)
GLUCOSE BLDC GLUCOMTR-MCNC: 179 MG/DL (ref 70–99)
GLUCOSE BLDC GLUCOMTR-MCNC: 286 MG/DL (ref 70–99)
HGB BLD-MCNC: 9.3 G/DL (ref 11.7–15.7)

## 2019-08-13 PROCEDURE — 97110 THERAPEUTIC EXERCISES: CPT | Mod: GP | Performed by: PHYSICAL THERAPIST

## 2019-08-13 PROCEDURE — 25000128 H RX IP 250 OP 636: Performed by: PHYSICIAN ASSISTANT

## 2019-08-13 PROCEDURE — 97161 PT EVAL LOW COMPLEX 20 MIN: CPT | Mod: GP | Performed by: PHYSICAL THERAPIST

## 2019-08-13 PROCEDURE — 36415 COLL VENOUS BLD VENIPUNCTURE: CPT | Performed by: ORTHOPAEDIC SURGERY

## 2019-08-13 PROCEDURE — 25000131 ZZH RX MED GY IP 250 OP 636 PS 637: Mod: GY | Performed by: PHYSICIAN ASSISTANT

## 2019-08-13 PROCEDURE — 97530 THERAPEUTIC ACTIVITIES: CPT | Mod: GP | Performed by: PHYSICAL THERAPIST

## 2019-08-13 PROCEDURE — 25000132 ZZH RX MED GY IP 250 OP 250 PS 637: Mod: GY | Performed by: PHYSICIAN ASSISTANT

## 2019-08-13 PROCEDURE — 97116 GAIT TRAINING THERAPY: CPT | Mod: GP | Performed by: PHYSICAL THERAPIST

## 2019-08-13 PROCEDURE — 25000128 H RX IP 250 OP 636: Performed by: ORTHOPAEDIC SURGERY

## 2019-08-13 PROCEDURE — 12000000 ZZH R&B MED SURG/OB

## 2019-08-13 PROCEDURE — 00000146 ZZHCL STATISTIC GLUCOSE BY METER IP

## 2019-08-13 PROCEDURE — 25000132 ZZH RX MED GY IP 250 OP 250 PS 637: Mod: GY | Performed by: ORTHOPAEDIC SURGERY

## 2019-08-13 PROCEDURE — 99232 SBSQ HOSP IP/OBS MODERATE 35: CPT | Performed by: PHYSICIAN ASSISTANT

## 2019-08-13 PROCEDURE — 85018 HEMOGLOBIN: CPT | Performed by: ORTHOPAEDIC SURGERY

## 2019-08-13 RX ORDER — ASPIRIN 325 MG
325 TABLET ORAL DAILY
Qty: 42 TABLET | Refills: 0 | COMMUNITY
Start: 2019-08-13 | End: 2019-09-24

## 2019-08-13 RX ORDER — SENNOSIDES 8.6 MG
1-2 TABLET ORAL 2 TIMES DAILY PRN
Qty: 60 TABLET | Refills: 1 | Status: SHIPPED | OUTPATIENT
Start: 2019-08-13

## 2019-08-13 RX ORDER — HYDROMORPHONE HYDROCHLORIDE 2 MG/1
2-4 TABLET ORAL EVERY 4 HOURS PRN
Qty: 40 TABLET | Refills: 0 | Status: SHIPPED | OUTPATIENT
Start: 2019-08-13

## 2019-08-13 RX ORDER — ACETAMINOPHEN 325 MG/1
325-650 TABLET ORAL EVERY 4 HOURS PRN
Qty: 100 TABLET | Refills: 0 | Status: SHIPPED | OUTPATIENT
Start: 2019-08-13 | End: 2019-09-12

## 2019-08-13 RX ORDER — KETOROLAC TROMETHAMINE 15 MG/ML
15 INJECTION, SOLUTION INTRAMUSCULAR; INTRAVENOUS ONCE
Status: COMPLETED | OUTPATIENT
Start: 2019-08-13 | End: 2019-08-13

## 2019-08-13 RX ADMIN — ATORVASTATIN CALCIUM 40 MG: 20 TABLET, FILM COATED ORAL at 21:43

## 2019-08-13 RX ADMIN — LOSARTAN POTASSIUM 25 MG: 25 TABLET ORAL at 21:43

## 2019-08-13 RX ADMIN — ACETAMINOPHEN 975 MG: 325 TABLET, FILM COATED ORAL at 00:02

## 2019-08-13 RX ADMIN — INSULIN ASPART 10 UNITS: 100 INJECTION, SOLUTION INTRAVENOUS; SUBCUTANEOUS at 17:23

## 2019-08-13 RX ADMIN — SENNOSIDES AND DOCUSATE SODIUM 2 TABLET: 8.6; 5 TABLET ORAL at 20:01

## 2019-08-13 RX ADMIN — HYDROMORPHONE HYDROCHLORIDE 4 MG: 2 TABLET ORAL at 23:58

## 2019-08-13 RX ADMIN — HYDROCHLOROTHIAZIDE 25 MG: 25 TABLET ORAL at 07:49

## 2019-08-13 RX ADMIN — HYDROMORPHONE HYDROCHLORIDE 0.5 MG: 1 INJECTION, SOLUTION INTRAMUSCULAR; INTRAVENOUS; SUBCUTANEOUS at 01:54

## 2019-08-13 RX ADMIN — HYDROMORPHONE HYDROCHLORIDE 4 MG: 2 TABLET ORAL at 07:49

## 2019-08-13 RX ADMIN — GABAPENTIN 100 MG: 100 CAPSULE ORAL at 07:48

## 2019-08-13 RX ADMIN — ENOXAPARIN SODIUM 40 MG: 40 INJECTION SUBCUTANEOUS at 12:10

## 2019-08-13 RX ADMIN — KETOROLAC TROMETHAMINE 15 MG: 15 INJECTION, SOLUTION INTRAMUSCULAR; INTRAVENOUS at 12:40

## 2019-08-13 RX ADMIN — INSULIN ASPART 10 UNITS: 100 INJECTION, SOLUTION INTRAVENOUS; SUBCUTANEOUS at 12:17

## 2019-08-13 RX ADMIN — ACETAMINOPHEN 975 MG: 325 TABLET, FILM COATED ORAL at 15:50

## 2019-08-13 RX ADMIN — METFORMIN HYDROCHLORIDE 1000 MG: 500 TABLET ORAL at 21:44

## 2019-08-13 RX ADMIN — HYDROXYZINE HYDROCHLORIDE 10 MG: 10 TABLET ORAL at 10:46

## 2019-08-13 RX ADMIN — METFORMIN HYDROCHLORIDE 1000 MG: 500 TABLET ORAL at 07:49

## 2019-08-13 RX ADMIN — HYDROXYZINE HYDROCHLORIDE 10 MG: 10 TABLET ORAL at 01:54

## 2019-08-13 RX ADMIN — HYDROMORPHONE HYDROCHLORIDE 4 MG: 2 TABLET ORAL at 03:56

## 2019-08-13 RX ADMIN — CITALOPRAM HYDROBROMIDE 20 MG: 20 TABLET ORAL at 21:43

## 2019-08-13 RX ADMIN — HYDROXYZINE HYDROCHLORIDE 10 MG: 10 TABLET ORAL at 17:23

## 2019-08-13 RX ADMIN — CEFAZOLIN SODIUM 2 G: 2 INJECTION, SOLUTION INTRAVENOUS at 03:55

## 2019-08-13 RX ADMIN — HYDROMORPHONE HYDROCHLORIDE 4 MG: 2 TABLET ORAL at 20:02

## 2019-08-13 RX ADMIN — GABAPENTIN 100 MG: 100 CAPSULE ORAL at 14:00

## 2019-08-13 RX ADMIN — HYDROMORPHONE HYDROCHLORIDE 4 MG: 2 TABLET ORAL at 12:10

## 2019-08-13 RX ADMIN — HYDROMORPHONE HYDROCHLORIDE 4 MG: 2 TABLET ORAL at 15:50

## 2019-08-13 RX ADMIN — SENNOSIDES AND DOCUSATE SODIUM 2 TABLET: 8.6; 5 TABLET ORAL at 07:48

## 2019-08-13 RX ADMIN — ACETAMINOPHEN 975 MG: 325 TABLET, FILM COATED ORAL at 07:48

## 2019-08-13 RX ADMIN — GABAPENTIN 100 MG: 100 CAPSULE ORAL at 20:01

## 2019-08-13 RX ADMIN — LEVOTHYROXINE SODIUM 112 MCG: 112 TABLET ORAL at 07:48

## 2019-08-13 RX ADMIN — ACETAMINOPHEN 975 MG: 325 TABLET, FILM COATED ORAL at 23:58

## 2019-08-13 ASSESSMENT — ACTIVITIES OF DAILY LIVING (ADL)
ADLS_ACUITY_SCORE: 11

## 2019-08-13 NOTE — PLAN OF CARE
Discharge Planner PT   Patient plan for discharge: Home  Current status:Eval completed-  AMb 40 feet x1 withRW. SBA. Appears to have intermittent high pain. Guarded , required several standing rest breaks  Barriers to return to prior living situation: medical stability/ pain. Mobility status.  ; will need to amb steps to enter her home  Recommendations for discharge: home, HC PT  Rationale for recommendations: POD 1status       Entered by: Renuka Frye 08/13/2019 11:27 AM

## 2019-08-13 NOTE — PLAN OF CARE
Patient A & O. Up to commode to void with assist of one and a walker. CMS intact. Dressing C/D/I.  Receiving Dilaudid 10 mg oral for pain. Hemovac had 150 ml output this shift.

## 2019-08-13 NOTE — PLAN OF CARE
Patient using oral Dilaudid, Gabapentin and Atarax for right knee discomfort.  Up with one assist, ice to knee, will discontinue hemovac and change dressing today.

## 2019-08-13 NOTE — PLAN OF CARE
Patient pulled out IV accidentally  New IV 22 g. Placed for last IVPB of Ancef  0200   Patient up with STAND BY ASSIST & walker to bathroom

## 2019-08-13 NOTE — PROGRESS NOTES
"[facility] Orthopaedics Progress Note      Post-operative Day: 1 Day Post-Op    Procedure(s):  Total Knee Arthroplasty      Subjective:  No past medical history on file.  Pain: moderate  aching to R knee. Denies shooting pain, parasthesias, numbness and weakness.   denies Chest pain, SOB, O2 required: None  denies nausea/ emesis  denies lightheadedness, dizziness and weakness  BM -, passing gas +. Urinating well, [place] in place: no.       Objective:  Blood pressure 120/57, pulse 61, temperature 98.7  F (37.1  C), temperature source Oral, resp. rate 16, height 1.626 m (5' 4\"), weight 90.2 kg (198 lb 13.7 oz), SpO2 94 %.    Patient Vitals for the past 24 hrs:   BP Temp Temp src Pulse Heart Rate Resp SpO2 Height Weight   08/13/19 1131 120/57 98.7  F (37.1  C) Oral 61 -- 16 94 % -- --   08/13/19 0710 121/63 97.3  F (36.3  C) Oral 68 -- 16 -- -- --   08/13/19 0419 111/47 97.6  F (36.4  C) Oral 65 -- 16 92 % -- --   08/12/19 2250 -- -- -- 66 -- -- -- -- --   08/12/19 2245 118/69 98  F (36.7  C) -- 66 -- -- 94 % -- --   08/12/19 2130 -- -- -- -- -- 16 -- -- --   08/12/19 2110 137/71 -- -- 74 -- -- -- -- --   08/12/19 2010 136/65 -- -- 70 -- -- -- -- --   08/12/19 1910 122/68 98  F (36.7  C) Oral 66 -- 15 95 % -- --   08/12/19 1810 (!) 143/57 -- -- 71 70 14 93 % -- --   08/12/19 1800 -- -- -- -- -- 15 95 % -- --   08/12/19 1723 -- -- -- -- -- 16 -- -- --   08/12/19 1710 (!) 148/68 -- -- 69 -- -- -- -- --   08/12/19 1645 129/55 -- -- -- -- -- -- -- --   08/12/19 1640 -- -- -- 66 -- -- -- -- --   08/12/19 1615 (!) 144/61 -- -- -- -- -- -- -- --   08/12/19 1600 134/59 -- -- -- -- -- -- -- --   08/12/19 1555 134/59 -- -- 71 -- 11 96 % -- --   08/12/19 1540 121/61 -- -- 68 -- 15 95 % -- --   08/12/19 1536 127/60 -- -- 70 -- -- 94 % -- --   08/12/19 1524 131/62 98.9  F (37.2  C) Oral 69 -- 16 94 % -- --   08/12/19 1450 134/49 98  F (36.7  C) Oral 66 -- 16 94 % 1.626 m (5' 4\") 90.2 kg (198 lb 13.7 oz)   08/12/19 1432 -- -- -- -- " -- -- 99 % -- --   08/12/19 1430 (!) 151/87 -- -- 66 -- 14 -- -- --   08/12/19 1415 (!) 153/82 98  F (36.7  C) Oral 67 -- 15 -- -- --   08/12/19 1400 134/77 -- -- 60 61 20 97 % -- --   08/12/19 1345 133/70 -- -- 61 61 13 97 % -- --   08/12/19 1330 125/69 -- -- -- -- 15 97 % -- --   08/12/19 1323 138/66 97.8  F (36.6  C) Oral -- 69 16 98 % -- --       Wt Readings from Last 4 Encounters:   08/12/19 90.2 kg (198 lb 13.7 oz)     General: Alert. No apparent distress. Non-labored breathing. Appropriate affect.   MSK: R knee: dressing Clean, dry, and intact. Skin intact, no ecchymosis, no erythema. nontender to palpation to incision site. ROM appropriate for post op. Distal neurovascularly intact. Compartments soft and non-tender. No calf pain. Homans negative. PF/DF and EHL intact.       Pertinent Labs   Lab Results: personally reviewed.     Recent Labs   Lab Test 08/13/19  0452 08/12/19  2239   HGB 9.3*  --    PLT  --  240         Procedure(s):  Total Knee Arthroplasty  Plan: Anticoagulation protocol: Lovenox inpatient and then  mg daily at discharge  x 42  days            Pain medications:  dilaudid and tylenol            Weight bearing status:  WBAT            Dressing Change:  sterile dry dressing change with gauze and ACE. Prineo to remain intact until follow up.             Disposition:  Home tomorrow. It is medically necessary for this patient to remain hospitalized due to inadequately controlled pain post operatively.  Inpatient therapy is medically necessary, the patient has not yet met goals for a safe discharge.            Follow up: 2 week with [facility]            Continue cares and rehabilitation     Report completed by:  Guadalupe Killian PA-C  Date: 8/13/2019  Time: 11:37 AM

## 2019-08-13 NOTE — DISCHARGE INSTRUCTIONS
Orthopedic Surgery Discharge Instructions    1. Follow up:  Follow up with Sebas Thompson PA-C.  in 2 weeks for post op check and x rays as scheduled.  Call [phone] if appointment needed or questions. If you have questions or concerns while at home, please call [facility]. If it is an emergency, call [phone]. You may find the answers to questions in the included discharge packet from the hospital or your pre operative packet you received in clinic prior to surgery.    2. Pain Medication:  Use pain medication as directed. If you are prescribed narcotic pain medications (Oxycodone, Norco, Percocet, Tylenol #3, Dilaudid, or Tramadol) then you should try to wean off of them as tolerated. These are an AS NEEDED medication, so if you are not having significant pain you should try to take fewer pills at a time or spread out the doses out over a longer period of time than is written on the prescription. You should not drive a car or operate machinery if you are taking prescribed narcotic pain medication. You may not receive a refill on this medication by your surgical team until your follow up appointment, so try to wean off your narcotics as soon as possible. If you need a refill on this medication you may call your surgical team to discuss during business hours. Refills are not given on the weekend under any circumstances, so plan accordingly.    3. How to wean narcotics: Within 2-3 days of surgery you should be able to begin weaning your pain medications. If upon leaving the hospital you are taking 2 tabs every 3-4 hours, you should decrease this to 1 tab every 3-4 hours. Around 4-5 days after surgery you should begin decreasing the frequency of your pain medication to every 4-5 hours. You may also cut your pills in half to decrease the dose as you begin the weaning process. Create a weaning schedule of your pain medication when you get home from the hospital, you may be expected to make the prescription  last until your next appointment. Call your surgical team during business hours to discuss your pain medication if you have questions or concerns about the weaning process.    4. Tylenol and pain medications: If your pain pill contains Tylenol (i.e. Percocet, Norco, Tylenol #3) and you are also taking additional Tylenol/acetaminophen as a pain medication, ensure that you are not taking too much tylenol. Prescription narcotic medications that contain Tylenol usually have 325mg of Tylenol per pill and over-the-counter medications have variable doses of Tylenol. You should not exceed 4,000mg of Tylenol in a 24-hour period. Tylenol should be used in combination with your pain medication, if able, to help reduce the amount of pain medication you are taking.    5. NSAIDs (anti inflammatory medications): You may take NSAIDs to help control your pain at home.  NSAIDs are Ibuprofen, Motrin, Advil, Aleve, Naprosyn etc. You should use over the counter medications such as NSAIDs and Tylenol to wean off narcotics. If you are also taking Aspirin for blood clot prevention, you should use caution with NSAIDs as this may cause issues such as GI bleeding, upset stomach, and dark stools. Recommended doses of NSAIDs after surgery are 1-2 tabs every 6-8 hours, not to exceed 600 mg per dose. It is best to rotate Tylenol and NSAIDs if needed every 4 hours. Use these medications in between your narcotics to help reduce the amount of pain medication needed.      6. How to take over the counter medications with pain medications:  Sample medication schedule:   8 am: Pain medication  10 am: Tylenol 500-650 mg (skip if your pain medication contains tylenol, refer to #4)  12 pm: Pain medication  2 pm: NSAIDs 1-2 tabs, not to exceeded 600 mg  4 pm: Pain medication  6 pm: Tylenol 500-650 mg (skip if your pain medication contains tylenol, refer to #4)  8 pm: Pain medication    7. Applying ice to your incision: ice can provide additional pain relief  at home. Apply  ice in 20 minute intervals. Allow your skin to warm up to room temperature, approximately 40 minutes, before applying another ice pack.    8. Incision instructions:  Keep your incision clean, covered and dry until your post op appointment.  You may shower and get the incision wet if no drainage is present.  You may change your dressing as needed.  No additional adhesives should be put on knee.  Only use dry  guaze over Prineo glue tape and then apply 4 inch ACE wrap to hold dressings in place.     9. Physical therapy:  Continue physical therapy as soon as possible.  You will need to call a therapy department of your choice to arrange future appointments.  Your order for physical therapy is included in your discharge paperwork.     10. Blood Clot Prevention: Take Aspirin 325 mg  daily  for 42 days for anticoagulation. If you develop abdominal pain or have signs of bleeding - blood in stool or black stools, stop taking the medication and seek medical care. Walk often at home, walking will help reduce the risk of blood clots. If you have been prescribed karl stockings or compression stockings, wear them during the day until you follow up with your orthopedic team in clinic. If you were not given Karl Stockings in the hospital but would like them, they can be purchased over the counter at your local pharmacy.     11. Call the office if you have any questions/concerns or are experiencing the following:  - increasing drainage from the incision  - foul-smelling or malodorous drainage from the incision  - sudden increase or change in pain that is not controlled by your prescribed medications  - inability to urinate  - new onset of weakness, numbness or severe pain in the extremities  - bowel/bladder incontinence  - Fever greater than 101.5 degrees  - Nausea/vomitting causing inability to eat food or medications

## 2019-08-13 NOTE — PROGRESS NOTES
[facility]    Hospital Medicine Progress Note  Date of Service: 08/13/2019    Assessment & Plan   Randee Bey is a 68 year old female who presented on 8/12/2019 for scheduled Procedure(s):  Total Knee Arthroplasty by Girish Dean MD and is being followed by the hospital medicine service for co-management of acute and/or chronic perioperative medical problems.    Right knee DJD  S/p Procedure(s):  Right Total Knee Arthroplasty on 8/12/19  1 Day Post-Op    - pain control, wound cares, physical therapy, occupational therapy and DVT prophylaxis per orthopedic surgery service    Diabetes mellitus, type 2  Most recent A1c 7.6. Managed prior to admission with metformin 1000 mg bid, Levemir 100 U q hs, and Novolog 10 U tid.   - Continue prior to admission metformin, Levemir 100 U q hs, and Novolog 10 U tid  - Medium sliding scale insulin   - Hyper/hypoglycemia protocol  - Consistent carbohydrate diet    Benign essential hypertension  Pressures reviewed, stable. Managed prior to admission with hydrochlorothiazide 25 mg daily and losartan 25 mg q pm.  - Continue prior to admission hydrochlorothiazide and losartan with holding parameters    Hypothyroidism  Managed prior to admission with levothyroxine 112 mcg daily, continue.    Hyperlipidemia  Managed prior to admission with Lipitor 40 mg q hs, continue.    Depression  Managed prior to admission with Celexa 20 mg q pm, continue.      DVT Prophylaxis: as per orthopedic surgery service - Enoxaparin (Lovenox) SQ and Pneumatic Compression Devices  Code Status: Full Code    Lines: Peripheral   Cancino catheter: No    Discussion: Medically, the patient appears to be doing well.    Disposition: Anticipate discharge tomorrow. No anticipated barriers to discharge from internal medicine standpoint.    Attestation:  I have reviewed today's vital signs, notes, medications, labs and imaging.    Margo Barros PA-C  [facility] Hospitalist  Service  Pager: [phone]       Interval History   Patient feeling well after surgery. Pain was worse overnight, but currently rated 2-3/10. Denies numbness or tingling.     Tolerating oral intake, denies abdominal pain, nausea, vomiting. No bowel movement since surgery, passing flatus. Urinating without difficulty or dysuria.    Denies chest pain, palpitations, cough, wheeze, shortness of breath, headache, vision changes, dizziness, or other complaints.    Physical Exam   Temp:  [97.3  F (36.3  C)-98.9  F (37.2  C)] 97.3  F (36.3  C)  Pulse:  [59-74] 68  Heart Rate:  [61-70] 70  Resp:  [11-20] 16  BP: (111-153)/(47-87) 121/63  SpO2:  [92 %-99 %] 92 %    Weights:   Vitals:    08/12/19 1026 08/12/19 1450   Weight: 85.7 kg (189 lb) 90.2 kg (198 lb 13.7 oz)    Body mass index is 34.13 kg/m .    General appearance: Awake, alert, and in no apparent distress. Pleasant and conversational, speaking in full sentences.  CV: Regular rhythm & rate, no murmurs. No edema. Peripheral pulses intact.  Respiratory: Moving air well bilaterally, no wheezing, crackles, or rhonchi.  GI: Non-distended, soft, nontender to palpation. No rebound or guarding. Normoactive bowel sounds.  Skin: Warm, dry, no rashes or ecchymoses. No mottling of skin. ACE bandage present on right lower extremity, clean and dry, not removed to visualize incision site.  Musculoskeletal / extremities: Moves all extremities equally, no obvious abnormalities. Distal CMS intact.  Neurologic: No focal deficits.      Data   Recent Labs   Lab 08/13/19  0452 08/12/19  2239   HGB 9.3*  --    PLT  --  240   CR  --  1.04       Recent Labs   Lab 08/13/19  0719 08/13/19  0236 08/12/19  2106 08/12/19  1558 08/12/19  1331 08/12/19  1100   * 286* 331* 274* 134* 136*        Unresulted Labs Ordered in the Past 30 Days of this Admission     No orders found for last 31 day(s).           Imaging  No results found for this or any previous visit (from the past 24 hour(s)).      I reviewed all new labs and imaging results over the last 24 hours. I personally reviewed no images or EKG's today.    Medications     lactated ringers 100 mL/hr at 08/13/19 0600       acetaminophen  975 mg Oral Q8H     atorvastatin  40 mg Oral At Bedtime     citalopram  20 mg Oral QPM     enoxaparin  40 mg Subcutaneous Q24H     gabapentin  100 mg Oral TID     hydrochlorothiazide  25 mg Oral Daily     insulin aspart  10 Units Subcutaneous TID AC     insulin aspart  1-7 Units Subcutaneous TID AC     insulin aspart  1-5 Units Subcutaneous At Bedtime     insulin detemir  100 Units Subcutaneous At Bedtime     levothyroxine  112 mcg Oral Daily     losartan  25 mg Oral QPM     metFORMIN  1,000 mg Oral BID w/meals     senna-docusate  1 tablet Oral BID    Or     senna-docusate  2 tablet Oral BID     sodium chloride (PF)  3 mL Intracatheter Q8H       Margo Barros PA-C  [facility] Hospitalist Service  Pager: [phone]

## 2019-08-13 NOTE — PROGRESS NOTES
08/13/19 1000   Quick Adds   Type of Visit Initial PT Evaluation   Living Environment   Lives With alone   Living Arrangements house   Home Accessibility stairs to enter home   Number of Stairs, Main Entrance 6  (6 VS 2 dependening on entrance)   Self-Care   Activity/Exercise/Self-Care Comment Pre-op strengthening   Functional Level Prior   Ambulation 0-->independent   Transferring 0-->independent   Toileting 0-->independent   Bathing 0-->independent   Communication 0-->understands/communicates without difficulty   Swallowing 0-->swallows foods/liquids without difficulty   Cognition 0 - no cognition issues reported   Fall history within last six months no   Which of the above functional risks had a recent onset or change? none   Prior Functional Level Comment PLOF- Pt reports indep. ambulation wtih no device, pain.    General Information   Onset of Illness/Injury or Date of Surgery - Date 08/12/19   Referring Physician Jermaine   Patient/Family Goals Statement Pt w/ plans on discharge to home; home care services.  Pt reports her son, then friends will be staying with her   Pertinent History of Current Problem (include personal factors and/or comorbidities that impact the POC) Primary osteoarthrosis, right knee. ; s/p right total knee arthroplasty.    Weight-Bearing Status - RLE weight-bearing as tolerated   General Observations Pt alet- supine in bed w/ CPM in place. Pain at 7/10   Pain Assessment   Patient Currently in Pain Yes, see Vital Sign flowsheet   Integumentary/Edema   Integumentary/Edema Comments N- compression ace wrap , hemovac in place   Range of Motion (ROM)   ROM Comment 10-62 degrees   Strength   Strength Comments Min. assistance w/ SLR   MMT: Knee, Rehab Eval   Knee Flexion - Right Side (3/5) fair, right   Knee Extension - Right Side (3/5) fair, right   Bed Mobility   Bed Mobility Comments SBA supine> sitting using UEs to assist   Transfer Skills   Transfer Comments SBA sit<> stand w/ RW   Gait  "  Gait Comments Pt  instructed on WBAT, gait sequence. AMb 40 feet x1 withRW. SBA. Appears to have intermittent high pain. Guarded , required several standing rest breaks. cued pt for sequence, to allow flex as able .  c/o weakness, fatigue   Balance   Balance Comments good dynamic standing balacne w/ RW use   Sensory Examination   Sensory Perception no deficits were identified   Modality Interventions   Planned Modality Interventions Cryotherapy   General Therapy Interventions   Planned Therapy Interventions gait training;ROM;strengthening;home program guidelines   Clinical Impression   Criteria for Skilled Therapeutic Intervention yes, treatment indicated   PT Diagnosis right total knee arthroplasty.    Influenced by the following impairments Decreased ROM/ strength, pain   Functional limitations due to impairments Altered mobility   Clinical Presentation Stable/Uncomplicated   Clinical Presentation Rationale clinical judgement   Clinical Decision Making (Complexity) Low complexity   Therapy Frequency 2x/day   Predicted Duration of Therapy Intervention (days/wks) 1 day   Anticipated Equipment Needs at Discharge   (Pt has a RW, SEC for home)   Anticipated Discharge Disposition Home with Home Therapy   Risk & Benefits of therapy have been explained Yes   Patient, Family & other staff in agreement with plan of care Yes   [facility] AM-PAC  \"6 Clicks\" V.2 Basic Mobility Inpatient Short Form   1. Turning from your back to your side while in a flat bed without using bedrails? 3 - A Little   2. Moving from lying on your back to sitting on the side of a flat bed without using bedrails? 3 - A Little   3. Moving to and from a bed to a chair (including a wheelchair)? 3 - A Little   4. Standing up from a chair using your arms (e.g., wheelchair, or bedside chair)? 3 - A Little   5. To walk in hospital room? 3 - A Little     "

## 2019-08-13 NOTE — PLAN OF CARE
Patient A & O  Pain controlled with present pain regime  Right leg dressing C, D, I,   No drainage on dressing.  Hemovac patent, IV patent

## 2019-08-13 NOTE — PLAN OF CARE
OT: Per discussion with physical therapy appropriate and pt would prefer to initiate IP OT tomorrow (POD#2). Will initiate IP OT tomorrow per pt request.

## 2019-08-14 ENCOUNTER — APPOINTMENT (OUTPATIENT)
Dept: OCCUPATIONAL THERAPY | Facility: CLINIC | Age: 69
DRG: 470 | End: 2019-08-14
Attending: ORTHOPAEDIC SURGERY
Payer: MEDICARE

## 2019-08-14 ENCOUNTER — APPOINTMENT (OUTPATIENT)
Dept: PHYSICAL THERAPY | Facility: CLINIC | Age: 69
DRG: 470 | End: 2019-08-14
Attending: ORTHOPAEDIC SURGERY
Payer: MEDICARE

## 2019-08-14 VITALS
DIASTOLIC BLOOD PRESSURE: 65 MMHG | BODY MASS INDEX: 33.95 KG/M2 | OXYGEN SATURATION: 96 % | HEIGHT: 64 IN | TEMPERATURE: 99 F | RESPIRATION RATE: 16 BRPM | WEIGHT: 198.85 LBS | HEART RATE: 62 BPM | SYSTOLIC BLOOD PRESSURE: 126 MMHG

## 2019-08-14 LAB
GLUCOSE BLDC GLUCOMTR-MCNC: 105 MG/DL (ref 70–99)
GLUCOSE BLDC GLUCOMTR-MCNC: 74 MG/DL (ref 70–99)
GLUCOSE BLDC GLUCOMTR-MCNC: 81 MG/DL (ref 70–99)
HGB BLD-MCNC: 8.7 G/DL (ref 11.7–15.7)

## 2019-08-14 PROCEDURE — 36415 COLL VENOUS BLD VENIPUNCTURE: CPT | Performed by: ORTHOPAEDIC SURGERY

## 2019-08-14 PROCEDURE — 97165 OT EVAL LOW COMPLEX 30 MIN: CPT | Mod: GO

## 2019-08-14 PROCEDURE — 00000146 ZZHCL STATISTIC GLUCOSE BY METER IP

## 2019-08-14 PROCEDURE — 85018 HEMOGLOBIN: CPT | Performed by: ORTHOPAEDIC SURGERY

## 2019-08-14 PROCEDURE — 25000128 H RX IP 250 OP 636: Performed by: ORTHOPAEDIC SURGERY

## 2019-08-14 PROCEDURE — 25000132 ZZH RX MED GY IP 250 OP 250 PS 637: Mod: GY | Performed by: PHYSICIAN ASSISTANT

## 2019-08-14 PROCEDURE — 97116 GAIT TRAINING THERAPY: CPT | Mod: GP | Performed by: PHYSICAL THERAPIST

## 2019-08-14 PROCEDURE — 97110 THERAPEUTIC EXERCISES: CPT | Mod: GP | Performed by: PHYSICAL THERAPIST

## 2019-08-14 PROCEDURE — 97535 SELF CARE MNGMENT TRAINING: CPT | Mod: GO

## 2019-08-14 PROCEDURE — 25000132 ZZH RX MED GY IP 250 OP 250 PS 637: Mod: GY | Performed by: ORTHOPAEDIC SURGERY

## 2019-08-14 PROCEDURE — 99232 SBSQ HOSP IP/OBS MODERATE 35: CPT | Performed by: PHYSICIAN ASSISTANT

## 2019-08-14 RX ORDER — IBUPROFEN 400 MG/1
400 TABLET, FILM COATED ORAL EVERY 6 HOURS PRN
Status: DISCONTINUED | OUTPATIENT
Start: 2019-08-14 | End: 2019-08-14 | Stop reason: HOSPADM

## 2019-08-14 RX ADMIN — SENNOSIDES AND DOCUSATE SODIUM 2 TABLET: 8.6; 5 TABLET ORAL at 07:57

## 2019-08-14 RX ADMIN — LEVOTHYROXINE SODIUM 112 MCG: 112 TABLET ORAL at 06:06

## 2019-08-14 RX ADMIN — ACETAMINOPHEN 975 MG: 325 TABLET, FILM COATED ORAL at 07:56

## 2019-08-14 RX ADMIN — HYDROMORPHONE HYDROCHLORIDE 4 MG: 2 TABLET ORAL at 09:18

## 2019-08-14 RX ADMIN — HYDROMORPHONE HYDROCHLORIDE 4 MG: 2 TABLET ORAL at 05:20

## 2019-08-14 RX ADMIN — ENOXAPARIN SODIUM 40 MG: 40 INJECTION SUBCUTANEOUS at 11:25

## 2019-08-14 RX ADMIN — GABAPENTIN 100 MG: 100 CAPSULE ORAL at 07:57

## 2019-08-14 RX ADMIN — METFORMIN HYDROCHLORIDE 1000 MG: 500 TABLET ORAL at 07:57

## 2019-08-14 RX ADMIN — IBUPROFEN 400 MG: 400 TABLET ORAL at 09:18

## 2019-08-14 RX ADMIN — HYDROXYZINE HYDROCHLORIDE 10 MG: 10 TABLET ORAL at 05:20

## 2019-08-14 RX ADMIN — HYDROCHLOROTHIAZIDE 25 MG: 25 TABLET ORAL at 07:57

## 2019-08-14 ASSESSMENT — ACTIVITIES OF DAILY LIVING (ADL)
ADLS_ACUITY_SCORE: 11

## 2019-08-14 NOTE — PLAN OF CARE
"Discharge Planner OT   Patient plan for discharge: Home with assist from family and home care    Current status: Eval complete. Pt with difficulty completing LB dressing- educated pt on AE to assist with increasing independence. Pt then able to complete lower body dressing with modified independence using reacher and sock aid- pt has reacher and states friend can loan her sock aid. Educated pt on transfer techniques to use extended tub bench- pt declines formal practice. Pt with no further ADL concerns at this time. Pt did say she felt \"loopy\" from pain meds and pain in knee didn't appear to be controlled- RN updated.     Barriers to return to prior living situation: None. Pt lives alone, however family plans on staying with patient for a couple days upon discharge.     Recommendations for discharge: Home with assist and home care    Rationale for recommendations: Met all IP OT goals. Has assist at home as needed and has all recommended AE/DME.     Occupational Therapy Discharge Summary    Reason for therapy discharge:    All goals and outcomes met, no further needs identified.    Progress towards therapy goal(s). See goals on Care Plan in [facility] electronic health record for goal details.  Goals met    Therapy recommendation(s):    No further OT needs identified. Continue to work with PT            Entered by: Margie Gaitan 08/14/2019 8:59 AM       "

## 2019-08-14 NOTE — PROGRESS NOTES
HOME CARE HAND OFF  Patient Name: Randee Bey    MRN: [id number]    : 1950    Patient Zip Code: [address]    Admit Diagnosis: right knee osteoarthritis  Right knee DJD      Services Pt Needs at Home: PT    Discharge Support: Family/Friend Support    Living Arrangements: With family     or Address Other Than Pt: No    Wound Care: No    Anticipate DC Date: 2019

## 2019-08-14 NOTE — PLAN OF CARE
Physical Therapy Discharge Summary    Reason for therapy discharge:    Discharged to home with home therapy.    Progress towards therapy goal(s). See goals on Care Plan in [facility] electronic health record for goal details.  Goals met  Pt reports improved pain control today, but feels slightly off from pain medications. Pt amb. Short distances w/ RW , SBA ( amb 80 feet during PM session 8/13/19);able to amb steps with use of railing. SEC and CGA. Handout  Issued for sequence w/ s steps.  Pt with difficulty WB when ascending the steps, but feels she can manage w./ her  Son assisting. recommend gait belt use for safety   HEP issued    Therapy recommendation(s):    Continued therapy is recommended.  Rationale/Recommendations:  home PT to progress ROM/ strength, gait activities .

## 2019-08-14 NOTE — PROGRESS NOTES
[facility] Medicine Progress Note  Date of Service: 08/14/2019    Assessment & Plan   Randee Bey is a 68 year old female who presented on 8/12/2019 for scheduled Procedure(s):  Total Knee Arthroplasty by Girish Dean MD and is being followed by the hospital medicine service for co-management of acute and/or chronic perioperative medical problems.    Right knee DJD  S/p Procedure(s):  Right Total Knee Arthroplasty on 8/12/19  2 Days Post-Op   Hgb 9.3 -> 8.7   - pain control, wound cares, physical therapy, occupational therapy and DVT prophylaxis per orthopedic surgery service    Diabetes mellitus, type 2  Most recent A1c 7.6. Managed prior to admission with metformin 1000 mg bid, Levemir 100 U q hs, and Novolog 10 U tid. Glucose initially elevated, but has been < 180 since yesterday.  - Continue prior to admission metformin, Levemir 100 U q hs, and Novolog 10 U tid  - Medium sliding scale insulin   - Hyper/hypoglycemia protocol  - Consistent carbohydrate diet    Benign essential hypertension  Pressures reviewed, stable. Managed prior to admission with hydrochlorothiazide 25 mg daily and losartan 25 mg q pm.  - Continue prior to admission hydrochlorothiazide and losartan with holding parameters    Hypothyroidism  Managed prior to admission with levothyroxine 112 mcg daily, continued during hospitalization and upon discharge.    Hyperlipidemia  Managed prior to admission with Lipitor 40 mg q hs, continued during hospitalization and upon discharge.    Depression  Managed prior to admission with Celexa 20 mg q pm, continued during hospitalization and upon discharge.      DVT Prophylaxis: as per orthopedic surgery service - Enoxaparin (Lovenox) SQ and Pneumatic Compression Devices  Code Status: Full Code    Lines: Peripheral   Cancino catheter: No    Discussion: Medically, the patient appears stable for discharge    Disposition: Anticipate discharge today to home. No barrier  "to discharge from internal medicine standpoint.    Attestation:  I have reviewed today's vital signs, notes, medications, labs and imaging.    Margo Barros PA-C  [facility]  Pager: [phone]       Interval History   Patient's pain increased today compared to yesterday. Slept very well overnight, was \"completely out.\" This morning she is feeling very tired, lethargic, and \"foggy\" since her pain medications early this morning. Pain rated 4/10. Denies numbness or tingling.     Tolerating oral intake, denies abdominal pain, nausea, vomiting. No bowel movement since surgery, passing flatus. Urinating without difficulty or dysuria.    Denies chest pain, palpitations, cough, wheeze, shortness of breath, headache, vision changes, dizziness, or other complaints.    Physical Exam   Temp:  [98.2  F (36.8  C)-99  F (37.2  C)] 99  F (37.2  C)  Pulse:  [61-69] 62  Heart Rate:  [65] 65  Resp:  [16] 16  BP: (120-138)/(55-65) 126/65  SpO2:  [94 %-96 %] 96 %    Weights:   Vitals:    08/12/19 1026 08/12/19 1450   Weight: 85.7 kg (189 lb) 90.2 kg (198 lb 13.7 oz)    Body mass index is 34.13 kg/m .    General appearance: Awake, slightly somnolent but alert, and in no apparent distress. Pleasant and conversational, speaking in full sentences.  CV: Regular rhythm & rate, no murmurs. No edema. Peripheral pulses intact.  Respiratory: Moving air well bilaterally, no wheezing, crackles, or rhonchi.  GI: Non-distended, soft, nontender to palpation. No rebound or guarding. Normoactive bowel sounds.  Skin: Warm, dry, no rashes or ecchymoses. No mottling of skin. Short ACE bandage present around right knee, bandage clean and dry.   Musculoskeletal / extremities: Moves all extremities equally, no obvious abnormalities.  Distal CMS intact  Neurologic: No focal deficits.    Data   Recent Labs   Lab 08/14/19  0459 08/13/19  0452 08/12/19  2239   HGB 8.7* 9.3*  --    PLT  --   --  240   CR  --   --  1.04       Recent " Labs   Lab 08/14/19  0731 08/14/19  0202 08/13/19  2120 08/13/19  1611 08/13/19  1129 08/13/19  0719   BGM 81 74 138* 101* 151* 179*        Unresulted Labs Ordered in the Past 30 Days of this Admission     No orders found from 7/13/2019 to 8/13/2019.           Imaging  No results found for this or any previous visit (from the past 24 hour(s)).     I reviewed all new labs and imaging results over the last 24 hours. I personally reviewed no images or EKG's today.    Medications       acetaminophen  975 mg Oral Q8H     atorvastatin  40 mg Oral At Bedtime     citalopram  20 mg Oral QPM     enoxaparin  40 mg Subcutaneous Q24H     gabapentin  100 mg Oral TID     hydrochlorothiazide  25 mg Oral Daily     insulin aspart  10 Units Subcutaneous TID AC     insulin aspart  1-7 Units Subcutaneous TID AC     insulin aspart  1-5 Units Subcutaneous At Bedtime     insulin detemir  100 Units Subcutaneous At Bedtime     levothyroxine  112 mcg Oral Daily     losartan  25 mg Oral QPM     metFORMIN  1,000 mg Oral BID w/meals     senna-docusate  1 tablet Oral BID    Or     senna-docusate  2 tablet Oral BID     sodium chloride (PF)  3 mL Intracatheter Q8H       Margo Barros PA-C  [facility] Hospitalist Service  Pager: [phone]

## 2019-08-14 NOTE — DISCHARGE SUMMARY
[facility] Orthopedics Discharge Summary                                  [facility]     JOAN MICHELE [id number]   Age: 68 year old  PCP: Swati Antonio, [phone] 1950     Date of Admission:  8/12/2019  Date of Discharge::  8/14/2019  Discharge Physician:  Guadalupe Killian    Code status:  Full Code    Admission Information:  Admission Diagnosis:  right knee osteoarthritis  Right knee DJD    Post-Operative Day: 2 Days Post-Op     Reason for admission:  The patient was admitted for the following:Procedure(s) (LRB):  Total Knee Arthroplasty (Right)    Principal Problem:    Right knee DJD  Active Problems:    Diabetes mellitus, type 2 (H)    Hyperlipidemia    Depression    Benign essential hypertension    Hypothyroidism      Allergies:  Codeine and Morphine    Following the procedure noted above the patient was transferred to the post-op floor and started on:    Therapy:  physical therapy and occupational therapy  Anticoagulation Plan: Lovenox inpatient and then  mg daily at discharge  for 42 days  Pain Management: dilaudid and tylenol  Weight bearing status: Weight bearing as tolerated     The patient was followed and co-managed by the hospitalist service during the inpatient treatment course  Complications:  None  Consultations:  None     Pertinent Labs   Lab Results: personally reviewed.     Recent Labs   Lab Test 08/14/19  0459 08/13/19  0452 08/12/19  2239   HGB 8.7* 9.3*  --    PLT  --   --  240          Discharge Information:  Condition at discharge: Stable  Discharge destination:  Discharged to home     Medications at discharge:  Current Discharge Medication List      START taking these medications    Details   acetaminophen (TYLENOL) 325 MG tablet Take 1-2 tablets (325-650 mg) by mouth every 4 hours as needed for mild pain  Qty: 100 tablet, Refills: 0    Associated Diagnoses: Status post total right knee replacement      aspirin (ASA) 325 MG tablet Take 1 tablet (325 mg) by mouth  daily  Qty: 42 tablet, Refills: 0    Associated Diagnoses: Status post total right knee replacement      HYDROmorphone (DILAUDID) 2 MG tablet Take 1-2 tablets (2-4 mg) by mouth every 4 hours as needed for pain or moderate to severe pain  Qty: 40 tablet, Refills: 0    Associated Diagnoses: Status post total right knee replacement      order for DME Equipment being ordered: Walker Wheels () and Walker ()  Treatment Diagnosis: s/p TKA  Qty: 1 Units, Refills: 0    Associated Diagnoses: Status post total right knee replacement      sennosides (SENOKOT) 8.6 MG tablet Take 1-2 tablets by mouth 2 times daily as needed for constipation  Qty: 60 tablet, Refills: 1    Associated Diagnoses: Status post total right knee replacement         CONTINUE these medications which have NOT CHANGED    Details   atorvastatin (LIPITOR) 40 MG tablet Take 40 mg by mouth At Bedtime       citalopram (CELEXA) 40 MG tablet Take 20 mg by mouth every evening       Glucose Blood (BLOOD GLUCOSE TEST STRIPS) STRP by In Vitro route 3 times daily      hydrochlorothiazide (HYDRODIURIL) 25 MG tablet Take 25 mg by mouth daily       insulin aspart (NOVOLOG FLEXPEN) 100 UNIT/ML pen Inject 10 Units Subcutaneous 3 times daily (before meals)       insulin detemir (LEVEMIR FLEXTOUCH) 100 UNIT/ML pen 100 units subcutaneously at bedtime      insulin pen needle (BD VENITA U/F) 32G X 4 MM miscellaneous As directed at bedtime. For administering insulin at home.      levothyroxine (SYNTHROID/LEVOTHROID) 112 MCG tablet Take 112 mcg by mouth daily       losartan (COZAAR) 25 MG tablet Take 25 mg by mouth every evening       magnesium oxide (MAG-OX) 400 MG tablet Take 400 mg by mouth every evening       metFORMIN (GLUCOPHAGE) 500 MG tablet Take 1,000 mg by mouth 2 times daily (with meals)       naproxen sodium 220 MG capsule Take 220 mg by mouth 2 times daily (with meals)      ONETOUCH DELICA LANCETS 33G MISC Test 3 times daily      cholecalciferol (VITAMIN D3)  5000 units TABS tablet Take 5,000 Units by mouth daily In winter      cyclobenzaprine (FLEXERIL) 10 MG tablet Take 10 mg by mouth 3 times daily as needed          STOP taking these medications       aspirin 81 MG EC tablet Comments:   Reason for Stopping:                          Follow-Up Care:  Patient should be seen in the office in 10-14 days by the Orthopedic Surgeon/Physician Assistant.  Call [phone] for appointment or questions.    Guadalupe Killian

## 2019-08-14 NOTE — PLAN OF CARE
[facility] DISCHARGE NOTE    Patient discharged to home at 1:30 PM via wheel chair. Accompanied by friend  and staff. Discharge instructions reviewed with patient, opportunity offered to ask questions. Prescriptions sent to patients preferred pharmacy. All belongings sent with patient.    Ness Poon

## 2019-08-14 NOTE — PROGRESS NOTES
08/14/19 0800   Quick Adds   Type of Visit Initial Occupational Therapy Evaluation   Living Environment   Lives With alone   Living Arrangements house   Home Accessibility stairs to enter home   Number of Stairs, Main Entrance 6   Living Environment Comment tub/shower combo- pt plans on getting extended tub bench tomorrow. Pt states son's and friend plan on staying with her for a couple days upon discharge.    Self-Care   Equipment Currently Used at Home tub bench;dressing device   Activity/Exercise/Self-Care Comment pre-op strengthening   Functional Level   Ambulation 0-->independent   Transferring 0-->independent   Toileting 0-->independent  (has vanity to use on one side. )   Bathing 0-->independent   Dressing 0-->independent   Eating 0-->independent   Communication 0-->understands/communicates without difficulty   Swallowing 0-->swallows foods/liquids without difficulty   Cognition 0 - no cognition issues reported   Fall history within last six months no   Which of the above functional risks had a recent onset or change? none   General Information   Onset of Illness/Injury or Date of Surgery - Date 08/12/19   Referring Physician Girish Dean MD   Patient/Family Goals Statement to return home.    Additional Occupational Profile Info/Pertinent History of Current Problem s/p R TKA   Weight-Bearing Status - RLE weight-bearing as tolerated   Cognitive Status Examination   Orientation orientation to person, place and time   Level of Consciousness alert   Pain Assessment   Patient Currently in Pain Yes, see Vital Sign flowsheet   Transfer Skill: Bed to Chair/Chair to Bed   Level of [place]: Bed to Chair stand-by assist   Physical Assist/Nonphysical Assist: Bed to Chair 1 person assist   Weight-Bearing Restrictions weight-bearing as tolerated   Assistive Device - Transfer Skill Bed to Chair Chair to Bed Rehab Eval standard walker   Transfer Skill: Sit to Stand   Level of [place]: Sit/Stand  "stand-by assist   Physical Assist/Nonphysical Assist: Sit/Stand 1 person assist   Transfer Skill: Sit to Stand weight-bearing as tolerated   Assistive Device for Transfer: Sit/Stand standard walker   Transfer Skill: Toilet Transfer   Level of [place]: Toilet stand-by assist   Physical Assist/Nonphysical Assist: Toilet 1 person assist   Weight-Bearing Restrictions: Toilet weight-bearing as tolerated   Assistive Device standard walker   Upper Body Dressing   Level of [place]: Dress Upper Body independent   Lower Body Dressing   Level of [place]: Dress Lower Body minimum assist (75% patients effort)   Physical Assist/Nonphysical Assist: Dress Lower Body 1 person assist   Instrumental Activities of Daily Living (IADL)   IADL Comments Pt states family can assist upon discharge.    Activities of Daily Living Analysis   Impairments Contributing to Impaired Activities of Daily Living pain;post surgical precautions   General Therapy Interventions   Planned Therapy Interventions ADL retraining   Clinical Impression   Criteria for Skilled Therapeutic Interventions Met yes, treatment indicated   OT Diagnosis decreased independence with ADLs and functional mobiltiy    Influenced by the following impairments increased pain, decreased ROM in knee   Assessment of Occupational Performance 1-3 Performance Deficits   Identified Performance Deficits LB dressing, tub/shower transfer   Clinical Decision Making (Complexity) Low complexity   Therapy Frequency Daily   Predicted Duration of Therapy Intervention (days/wks) 1x treat   Anticipated Discharge Disposition Home with Assist   Risks and Benefits of Treatment have been explained. Yes   Patient, Family & other staff in agreement with plan of care Yes   [facility] AM-PAC  \"6 Clicks\" Daily Activity Inpatient Short Form   1. Putting on and taking off regular lower body clothing? 3 - A Little   2. Bathing (including washing, rinsing, drying)? 3 - A Little   3. " Toileting, which includes using toilet, bedpan or urinal? 4 - None   4. Putting on and taking off regular upper body clothing? 4 - None   5. Taking care of personal grooming such as brushing teeth? 4 - None   6. Eating meals? 4 - None   Daily Activity Raw Score (Score out of 24.Lower scores equate to lower levels of function) 22   Total Evaluation Time   Total Evaluation Time (Minutes) 6

## 2019-08-14 NOTE — PROGRESS NOTES
"[facility] Orthopaedics Progress Note      Post-operative Day: 2 Days Post-Op    Procedure(s):  Total Knee Arthroplasty      Subjective:  No past medical history on file.  Pain: moderate  aching to R knee. Denies shooting pain, parasthesias, numbness and weakness.   denies Chest pain, SOB, O2 required: None  denies nausea/ emesis  denies lightheadedness, dizziness and weakness  BM -, passing gas +. Urinating well, [place] in place: no.       Objective:  Blood pressure 126/65, pulse 62, temperature 99  F (37.2  C), temperature source Oral, resp. rate 16, height 1.626 m (5' 4\"), weight 90.2 kg (198 lb 13.7 oz), SpO2 96 %.    Patient Vitals for the past 24 hrs:   BP Temp Temp src Pulse Heart Rate Resp SpO2   08/14/19 0755 -- -- -- -- -- -- 96 %   08/14/19 0739 126/65 99  F (37.2  C) Oral 62 -- 16 --   08/14/19 0605 -- -- -- -- -- 16 --   08/14/19 0200 122/55 98.2  F (36.8  C) Oral -- 65 16 95 %   08/13/19 1635 -- -- -- -- -- 16 --   08/13/19 1516 138/56 98.8  F (37.1  C) Oral 69 -- 16 94 %   08/13/19 1255 -- -- -- -- -- 16 --   08/13/19 1131 120/57 98.7  F (37.1  C) Oral 61 -- 16 94 %       Wt Readings from Last 4 Encounters:   08/12/19 90.2 kg (198 lb 13.7 oz)     General: Alert. No apparent distress. Non-labored breathing. Appropriate affect.   MSK: R knee: dressing Clean, dry, and intact. Skin intact, no ecchymosis, no erythema. nontender to palpation to incision site. ROM appropriate for post op. Distal neurovascularly intact. Compartments soft and non-tender. No calf pain. Homans negative. PF/DF and EHL intact.       Pertinent Labs   Lab Results: personally reviewed.     Recent Labs   Lab Test 08/14/19  0459 08/13/19  0452 08/12/19  2239   HGB 8.7* 9.3*  --    PLT  --   --  240         Procedure(s):  Total Knee Arthroplasty  Plan: Anticoagulation protocol: Lovenox inpatient and then  mg daily at discharge  x 42  days            Pain medications:  dilaudid and tylenol            Weight bearing status:  WBAT   "          Dressing Change:  sterile dry dressing change with gauze and ACE. Prineo to remain intact until follow up.             Disposition:  Home today with home care            Follow up: 2 week with MAGDA            Continue cares and rehabilitation     Report completed by:  Guadalupe Killian PA-C  Date: 8/14/2019  Time: 10:28 AM

## 2019-08-14 NOTE — PROGRESS NOTES
SPIRITUAL HEALTH SERVICES  SPIRITUAL ASSESSMENT Progress Note  [facility] - Med/surg    Randee stated she was doing well and hoping to discharge today.  Her son, Kevon, would be coming later today to provide transport.  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  She liked that environment and that program.  Currently she lives in [place] and her employer doesn't use a similar model and it leaves her clients without a community support system when they are discharged.  No follow up visit planned but patient is aware of the availability of spiritual support on request.    Kevon Ng M.A., [facility]  Staff   [facility]  Office: [phone]  Cell: [phone]  Pager [phone]

## 2019-08-26 ENCOUNTER — HOSPITAL ENCOUNTER (OUTPATIENT)
Dept: ULTRASOUND IMAGING | Facility: CLINIC | Age: 69
Discharge: HOME | End: 2019-08-26
Attending: PHYSICIAN ASSISTANT | Admitting: PHYSICIAN ASSISTANT
Payer: MEDICARE

## 2019-08-26 DIAGNOSIS — S89.90XA INJURY, KNEE: ICD-10-CM

## 2019-08-26 PROCEDURE — 93971 EXTREMITY STUDY: CPT | Mod: RT,GZ

## 2019-09-04 ENCOUNTER — HOSPITAL ENCOUNTER (OUTPATIENT)
Dept: PHYSICAL THERAPY | Facility: CLINIC | Age: 69
Setting detail: THERAPIES SERIES
End: 2019-09-04
Attending: PHYSICIAN ASSISTANT
Payer: MEDICARE

## 2019-09-04 PROCEDURE — 97161 PT EVAL LOW COMPLEX 20 MIN: CPT | Mod: GP | Performed by: PHYSICAL MEDICINE & REHABILITATION

## 2019-09-04 PROCEDURE — 97110 THERAPEUTIC EXERCISES: CPT | Mod: GP | Performed by: PHYSICAL MEDICINE & REHABILITATION

## 2019-09-04 NOTE — PROGRESS NOTES
"   19 1400   General Information   Type of Visit Initial OP Ortho PT Evaluation   Start of Care Date 19   Referring Physician Guadalupe Killian PA-C   Patient/Family Goals Statement \"improve stair descent, improve walking, improve dressing (putitng on shoes), trim toe-nails\"   Orders Evaluate and Treat   Date of Order 19   Certification Required? Yes  (MEDICARE; BCBS MEDICARE SUPPLEMENT)   Medical Diagnosis Status post total right knee replacement   Surgical/Medical history reviewed Yes   Precautions/Limitations no known precautions/limitations   General Information Comments PMHx per pt report: depression, diabetes, arthritis, surgery on hernia,  x3, surgery on gall bladder.    Body Part(s)   Body Part(s) Knee   Presentation and Etiology   Pertinent history of current problem (include personal factors and/or comorbidities that impact the POC) Pt arrived to PT today for R knee pain s/p TKA performed 2019. Pt notes increased fatigue since surgery, and notes some change in sensation. Limited pain meds in the last few days. Has been recieving home PT. No longer using AD.    Impairments A. Pain;B. Decreased WB tolerance;D. Decreased ROM;E. Decreased flexibility;F. Decreased strength and endurance;G. Impaired balance;H. Impaired gait   Functional Limitations perform activities of daily living;perform required work activities;perform desired leisure / sports activities   Symptom Location R knee   How/Where did it occur Other  (TKA)   Onset date of current episode/exacerbation 19   Chronicity New   Pain rating (0-10 point scale) Best (/10);Worst (/10)   Best (/10) 4   Worst (/10) 5   Pain quality C. Aching;F. Stabbing   Frequency of pain/symptoms B. Intermittent   Pain/symptoms are: Worse during the day   Pain/symptoms exacerbated by A. Sitting;E. Rest   Pain/symptoms eased by H. Cold   Progression of symptoms since onset: Improved   Prior Level of Function   Prior Level of " "Function-Mobility independent   Prior Level of Function-ADLs independent   Current Level of Function   Current Community Support Family/friend caregiver   Patient role/employment history A. Employed   Employment Comments works as substance abuse therapist   [facility]   Home/community accessibility single level home with basement, with railing on L when ascending   Current equipment-Gait/Locomotion None   Fall Risk Screen   Fall screen completed by PT   Have you fallen 2 or more times in the past year? No   Have you fallen and had an injury in the past year? No   Is patient a fall risk? No   Abuse Screen (yes response referral indicated)   Feels Unsafe at Home or Work/School no   Feels Threatened by Someone no   Does Anyone Try to Keep You From Having Contact with Others or Doing Things Outside Your Home? no   Physical Signs of Abuse Present no   Functional Scales   Functional Scales Other   Other Scales  LEFS: 36/80   Knee Objective Findings   Side (if bilateral, select both right and left) Right   Observation pt preferred to have R foot propped on chair during eval. also noted R knee felt better when hanging off edge of tx table.   Posture rounded shoulders and forward head   Gait/Locomotion decreased heel strike and toe off, short stride length, decresaed stance time on R LE. able to ascend/descend stairs reciprocally however demosntrates increased hip drop and knee valgus   Knee/Hip Strength Comments Hip flexion: 4+/5 B, seated hip ABD: 5/5 B, seated hip ADD: 5/5 B, hip IR: 4/5 B, hip ER: 4/5 B   Step Test Height 6\"   Step Test Height Control Comment hip drop and increased knee valgus. poor ecc quad control   Knee Special Test Comments Special tests NT today per PA-C diagnosis   Palpation pt noted min increase in sx with mod palpation of medial and lateral jt line, patellar T, quads, hamstring insertion   Accessory Motion/Joint Mobility hypomobility of tibiofemoral and patellofemoral jts " in all directions   Right Knee Extension AROM lacking 10* (pain)   Right Knee Extension PROM lacking 10* (pain)   Right Knee Flexion AROM 87* (pain)   Right Knee Flexion PROM 90* (pain)   Right Knee Flexion Strength 4-/5 (pain)   Right Knee Extension Strength 4+/5   Right Hip Abduction Strength seated: 5/5 B   Right Quad Set Strength fair   R VMO Strength fair   Right Gastrocnemius Flexibility limited   Right Hamstring Flexibility limited   Right Hip Flexor Flexibility limited   Right Quadricep Flexibility limited   Planned Therapy Interventions   Planned Therapy Interventions ADL retraining;IADL retraining;balance training;bed mobility training;gait training;joint mobilization;manual therapy;motor coordination training;neuromuscular re-education;orthotic fitting/training;ROM;strengthening;stretching;transfer training   Planned Modality Interventions   Planned Modality Interventions Biofeedback;Contrast bath immersion;Cryotherapy;Electrical stimulation;Hot packs;Iontophoresis;Low level laser therapy;Shortwave diathermy;TENS;Traction;Ultrasound   Planned Modality Interventions Comments as needed   Clinical Impression   Criteria for Skilled Therapeutic Interventions Met yes, treatment indicated   PT Diagnosis R knee pain s/p TKA   Influenced by the following impairments decreased ROM, decreased strength, impaired gait, pain   Functional limitations due to impairments walking, stairs, transfers, ADLs   Clinical Presentation Stable/Uncomplicated   Clinical Presentation Rationale PLOF, high motivation, ROM, strength, clinical judgement   Clinical Decision Making (Complexity) Low complexity   Therapy Frequency 2 times/Week   Predicted Duration of Therapy Intervention (days/wks) 10 weeks (decreasing to 1x/week as appropriate)   Risk & Benefits of therapy have been explained Yes   Patient, Family & other staff in agreement with plan of care Yes   Clinical Impression Comments Pt is a 68 y.o. female who presented to the PT  clinic today with a rehab diagnosis of R knee pain s/p TKA as evidenced by decreased ROM, decreased strength, impaired gait and pain. Pt is appropriate for skilled PT to address previously listed impairments in order to decrease diffiuculty with walking, stairs, transfers and ADLs.    Education Assessment   Preferred Learning Style Listening;Reading;Demonstration;Pictures/video   Barriers to Learning No barriers   ORTHO GOALS   PT Ortho Eval Goals 1;2;3;4;5   Ortho Goal 1   Goal Identifier 1   Goal Description Pt will increase R knee extension to 0* in order to decrease difficulty with amb.    Target Date 10/02/19   Ortho Goal 2   Goal Identifier 2   Goal Description Pt will be able to clip toenails and put on sock/shoes without dificulty.    Target Date 10/16/19   Ortho Goal 3   Goal Identifier 3   Goal Description Pt will be able to flex R knee 110* in order to decrease difficulty with sit <> stand transfer.    Target Date 10/30/19   Ortho Goal 4   Goal Identifier 4   Goal Description Pt will be able to ascend/descend stairs reciprocally without increase in sx, and demonstrating good eccentric quad control.    Target Date 10/30/19   Ortho Goal 5   Goal Identifier 5   Goal Description Pt will be independent with HEP in order to self manage symptoms.    Target Date 11/15/19   Total Evaluation Time   PT Emmie, Low Complexity Minutes (67513) 25   Therapy Certification   Certification date from 09/04/19   Certification date to 11/15/19   Medical Diagnosis Status post total right knee replacement       Please contact me with any questions or concerns.    Thank you for your referral,     Keke Jackson, PT, DPT  Physical Therapist  [facility] - [address] [address] [phone]

## 2019-09-04 NOTE — PROGRESS NOTES
[facility]          OUTPATIENT PHYSICAL THERAPY ORTHOPEDIC EVALUATION  PLAN OF TREATMENT FOR OUTPATIENT REHABILITATION  (COMPLETE FOR INITIAL CLAIMS ONLY)  Patient's Last Name, First Name, M.I.  YOB: 1950  Randee Bey       Provider s Name:  [facility]   Medical Record No.  [id number]   Start of Care Date:  09/04/19   Onset Date:  08/12/19   Type:     _X__PT   ___OT   ___SLP Medical Diagnosis:  Status post total right knee replacement     PT Diagnosis:  R knee pain s/p TKA   Visits from SOC:  1      _________________________________________________________________________________  Plan of Treatment/Functional Goals:  ADL retraining, IADL retraining, balance training, bed mobility training, gait training, joint mobilization, manual therapy, motor coordination training, neuromuscular re-education, orthotic fitting/training, ROM, strengthening, stretching, transfer training     Biofeedback, Contrast bath immersion, Cryotherapy, Electrical stimulation, Hot packs, Iontophoresis, Low level laser therapy, Shortwave diathermy, TENS, Traction, Ultrasound  as needed  Goals  Goal Identifier: 1  Goal Description: Pt will increase R knee extension to 0* in order to decrease difficulty with amb.   Target Date: 10/02/19    Goal Identifier: 2  Goal Description: Pt will be able to clip toenails and put on sock/shoes without dificulty.   Target Date: 10/16/19    Goal Identifier: 3  Goal Description: Pt will be able to flex R knee 110* in order to decrease difficulty with sit <> stand transfer.   Target Date: 10/30/19    Goal Identifier: 4  Goal Description: Pt will be able to ascend/descend stairs reciprocally without increase in sx, and demonstrating good eccentric quad control.   Target Date: 10/30/19    Goal Identifier: 5  Goal Description: Pt will be independent with HEP in order to self manage symptoms.   Target Date: 11/15/19      Therapy Frequency:  2  times/Week  Predicted Duration of Therapy Intervention:  10 weeks (decreasing to 1x/week as appropriate)    Keke Jackson, PT                 I CERTIFY THE NEED FOR THESE SERVICES FURNISHED UNDER        THIS PLAN OF TREATMENT AND WHILE UNDER MY CARE .             Physician Signature               Date    X_____________________________________________________                             Certification Date From:  09/04/19   Certification Date To:  11/15/19    Referring Provider:  Guadalupe Killian PA-C    Initial Assessment        See Epic Evaluation Start of Care Date: 09/04/19

## 2019-09-11 ENCOUNTER — HOSPITAL ENCOUNTER (OUTPATIENT)
Dept: PHYSICAL THERAPY | Facility: CLINIC | Age: 69
Setting detail: THERAPIES SERIES
End: 2019-09-11
Attending: PHYSICIAN ASSISTANT
Payer: MEDICARE

## 2019-09-11 PROCEDURE — 97110 THERAPEUTIC EXERCISES: CPT | Mod: GP | Performed by: PHYSICAL MEDICINE & REHABILITATION

## 2019-09-13 ENCOUNTER — HOSPITAL ENCOUNTER (OUTPATIENT)
Dept: PHYSICAL THERAPY | Facility: CLINIC | Age: 69
Setting detail: THERAPIES SERIES
End: 2019-09-13
Attending: PHYSICIAN ASSISTANT
Payer: MEDICARE

## 2019-09-13 PROCEDURE — 97110 THERAPEUTIC EXERCISES: CPT | Mod: GP | Performed by: PHYSICAL MEDICINE & REHABILITATION

## 2019-09-17 ENCOUNTER — HOSPITAL ENCOUNTER (OUTPATIENT)
Dept: PHYSICAL THERAPY | Facility: CLINIC | Age: 69
Setting detail: THERAPIES SERIES
End: 2019-09-17
Attending: PHYSICIAN ASSISTANT
Payer: MEDICARE

## 2019-09-17 PROCEDURE — 97110 THERAPEUTIC EXERCISES: CPT | Mod: GP | Performed by: PHYSICAL MEDICINE & REHABILITATION

## 2019-09-20 ENCOUNTER — HOSPITAL ENCOUNTER (OUTPATIENT)
Dept: PHYSICAL THERAPY | Facility: CLINIC | Age: 69
Setting detail: THERAPIES SERIES
End: 2019-09-20
Attending: PHYSICIAN ASSISTANT
Payer: MEDICARE

## 2019-09-20 PROCEDURE — 97110 THERAPEUTIC EXERCISES: CPT | Mod: GP | Performed by: PHYSICAL MEDICINE & REHABILITATION

## 2019-09-24 ENCOUNTER — HOSPITAL ENCOUNTER (OUTPATIENT)
Dept: PHYSICAL THERAPY | Facility: CLINIC | Age: 69
Setting detail: THERAPIES SERIES
End: 2019-09-24
Attending: PHYSICIAN ASSISTANT
Payer: MEDICARE

## 2019-09-24 PROCEDURE — 97110 THERAPEUTIC EXERCISES: CPT | Mod: GP | Performed by: PHYSICAL MEDICINE & REHABILITATION

## 2019-09-24 PROCEDURE — 97112 NEUROMUSCULAR REEDUCATION: CPT | Mod: GP | Performed by: PHYSICAL MEDICINE & REHABILITATION

## 2019-09-27 ENCOUNTER — HOSPITAL ENCOUNTER (OUTPATIENT)
Dept: PHYSICAL THERAPY | Facility: CLINIC | Age: 69
Setting detail: THERAPIES SERIES
End: 2019-09-27
Attending: PHYSICIAN ASSISTANT
Payer: MEDICARE

## 2019-09-27 PROCEDURE — 97110 THERAPEUTIC EXERCISES: CPT | Mod: GP | Performed by: PHYSICAL MEDICINE & REHABILITATION

## 2019-10-04 ENCOUNTER — HOSPITAL ENCOUNTER (OUTPATIENT)
Dept: PHYSICAL THERAPY | Facility: CLINIC | Age: 69
Setting detail: THERAPIES SERIES
End: 2019-10-04
Attending: PHYSICIAN ASSISTANT
Payer: MEDICARE

## 2019-10-04 PROCEDURE — 97110 THERAPEUTIC EXERCISES: CPT | Mod: GP | Performed by: PHYSICAL MEDICINE & REHABILITATION

## 2019-10-04 PROCEDURE — 97140 MANUAL THERAPY 1/> REGIONS: CPT | Mod: GP | Performed by: PHYSICAL MEDICINE & REHABILITATION

## 2019-10-15 ENCOUNTER — HOSPITAL ENCOUNTER (OUTPATIENT)
Dept: PHYSICAL THERAPY | Facility: CLINIC | Age: 69
Setting detail: THERAPIES SERIES
End: 2019-10-15
Attending: PHYSICIAN ASSISTANT
Payer: MEDICARE

## 2019-10-15 PROCEDURE — 97110 THERAPEUTIC EXERCISES: CPT | Mod: GP | Performed by: PHYSICAL MEDICINE & REHABILITATION

## 2019-11-29 NOTE — PROGRESS NOTES
Outpatient Physical Therapy Discharge Note     Patient: Randee Bey  : 1950    Beginning/End Dates of Reporting Period:  2019 to 10/15/2019    Referring Provider: Guadalupe Killian PA-C    Therapy Diagnosis: R knee pain s/p TKA    Patient did not return for follow up treatments as directed.  Goal status and current objective information is therefore unknown.  Discharge from PT services at this time for this episode of treatment. Please see attached documentation under this episode of care for further information including dates of service, start of care date, referring physician, Dx, treatment plan, treatments, etc.    Please contact me with any questions or concerns.    Thank you for your referral.    Keke Jackson, PT, DPT  Physical Therapist   [address] [address] [phone]

## 2025-05-27 ENCOUNTER — THERAPY VISIT (OUTPATIENT)
Dept: PHYSICAL THERAPY | Facility: CLINIC | Age: 75
End: 2025-05-27
Attending: FAMILY MEDICINE
Payer: COMMERCIAL

## 2025-05-27 DIAGNOSIS — R26.89 BALANCE PROBLEM: ICD-10-CM

## 2025-05-27 PROCEDURE — 97112 NEUROMUSCULAR REEDUCATION: CPT | Mod: GP | Performed by: PHYSICAL THERAPIST

## 2025-05-27 PROCEDURE — 97110 THERAPEUTIC EXERCISES: CPT | Mod: GP | Performed by: PHYSICAL THERAPIST

## 2025-05-27 PROCEDURE — 97161 PT EVAL LOW COMPLEX 20 MIN: CPT | Mod: GP | Performed by: PHYSICAL THERAPIST

## 2025-05-27 NOTE — PROGRESS NOTES
PHYSICAL THERAPY EVALUATION  Type of Visit: Evaluation        Fall Risk Screen:  Have you fallen 2 or more times in the past year?: No  Have you fallen and had an injury in the past year?: Yes  Is patient receiving Physical Therapy Services?: Yes  Fall screen comments: scored 28/30 on FGA, pt has fear for falling,    Subjective   In October and fell coming down off a step.  Twisted her ankle and hit her nose at that time.  Feels hesitant since then with steps especially.  Feels increased caution with higher level balance activities.  No dizziness or spinning sensations.  Not using any kind of assistive devices.  No changes in level of assistance needed at home.          Presenting condition or subjective complaint: balance  Date of onset: 10/01/24    Relevant medical history: Arthritis; Diabetes; High blood pressure   Dates & types of surgery: csections x 3, hernias x 2, hand surgery x 2, knee replacement right, gallbladder, tonsils ,    Prior diagnostic imaging/testing results: Other none   Prior therapy history for the same diagnosis, illness or injury: No      Prior Level of Function  Transfers: Independent  Ambulation: Independent  ADL:   IADL:     Living Environment  Social support: Alone   Type of home: Apartment/condo   Stairs to enter the home: Yes 5 Is there a railing: Yes     Ramp: No   Stairs inside the home: No       Help at home: None  Equipment owned:       Employment: No    Hobbies/Interests: gardening ,[employer], camping,traveling    Patient goals for therapy: learn    Pain assessment:      Objective   SENSATION: LE Sensation WNL  COORDINATION:   MUSCLE TONE: WNL    GAIT:   Level of [place]: WNL  Assistive Device(s): None  Gait Deviations: forward gaze, looking down, not paying attention to periphery, slight hip drop B   Gait Distance:   Stairs: reciprocal pattern up/down but rushes on the way down to get her foot on the step.  When asked to do it slowly she could but feels unsru of herself.      SPECIAL TESTS   PRE POST   Functional Gait Assessment (FGA) TOTAL SCORE: (MAXIMUM SCORE 30): 28 28/30    10 M Walk Test (Comfortable)      10 M Walk Test (Fast)      6 Minute Walk Test (6MWT)         Mini BESTest  Reactive postural control: scored 2 on all directions (normal)     5 Times Sit-to-Stand (5TSTS)  14 seconds     30 Sec Sit to Stand (reps/height) 11 reps       Dynamic Gait Index (DGI)      Timed Up and Go (TUG) - sec Regular:   Manual:   Cognitive:   Regular:   Manual:   Cognitive:     Single Leg Stance Right (sec)     Single Leg Stance Left (sec)          Romberg  (sec)     Sharpened Romberg (sec)     Other:          Assessment & Plan   CLINICAL IMPRESSIONS  Medical Diagnosis: balance problem    Treatment Diagnosis: impaired balance   Impression/Assessment: Patient is a 74 year old female with concerns of balance complaints.  The following significant findings have been identified: imbalance of magnocellular vs parvocellular awareness leading to feelings of imbalance and unsteadiness. These impairments interfere with their ability to perform self care tasks, recreational activities, household chores, household mobility, and community mobility as compared to previous level of function.     Clinical Decision Making (Complexity):  Clinical Presentation: Stable/Uncomplicated  Clinical Presentation Rationale: based on medical and personal factors listed in PT evaluation  Clinical Decision Making (Complexity): Low complexity    PLAN OF CARE  Treatment Interventions:  Interventions: Gait Training, Manual Therapy, Neuromuscular Re-education, Therapeutic Activity, Therapeutic Exercise, Self-Care/Home Management    Long Term Goals     PT Goal 1  Goal Identifier: 1  Goal Description: Patient will be able to walk with head turns with no LOB of feeling unsteady  Rationale: to maximize safety and independence within the community  Target Date: 06/24/25  PT Goal 2  Goal Identifier: 2  Goal Description: Patient will  report no fear of hesitation with descending a flight of stairs with reciprocal pattern.  Rationale: to maximize safety and independence within the community  Target Date: 08/19/25  PT Goal 3  Goal Identifier: 3  Goal Description: Patient will be able to play catch with a ball while walking without dropping the ball for 30 sconds in order to show improved magnocelluar and parvocellular awareness to help with balance.  Target Date: 08/19/25      Frequency of Treatment: 1x/week  Duration of Treatment: 12 weeks    Recommended Referrals to Other Professionals:   Education Assessment:   Learner/Method: Patient  Education Comments: educated on role of PT, POC and HEP    Risks and benefits of evaluation/treatment have been explained.   Patient/Family/caregiver agrees with Plan of Care.     Evaluation Time:     PT Eval, Low Complexity Minutes (94110): 17       Signing Clinician: Jillian Ontiveros PT        [facility]                                                                                   OUTPATIENT PHYSICAL THERAPY      PLAN OF TREATMENT FOR OUTPATIENT REHABILITATION   Patient's Last Name, First Name, Randee Torres YOB: 1950   Provider's Name   [facility]   Medical Record No.  [id number]     Onset Date: 10/01/24  Start of Care Date: 05/27/25     Medical Diagnosis:  balance problem      PT Treatment Diagnosis:  impaired balance Plan of Treatment  Frequency/Duration: 1x/week/ 12 weeks    Certification date from 05/27/25 to 08/19/25         See note for plan of treatment details and functional goals     Jillian Ontiveros PT                         I CERTIFY THE NEED FOR THESE SERVICES FURNISHED UNDER        THIS PLAN OF TREATMENT AND WHILE UNDER MY CARE .             Physician Signature               Date    X_____________________________________________________                  Referring Provider:  Veto Figueroa  Assessment  See Epic Evaluation- Start of Care Date: 05/27/25

## (undated) DEVICE — KIT DRAIN CLOSED WOUND SUCTION MED 400ML RESVR

## (undated) DEVICE — GLOVE PROTEXIS MICRO 7.5  2D73PM75

## (undated) DEVICE — GOWN LG DISP 9515

## (undated) DEVICE — SU VICRYL 0 CT-1 27" UND J260H

## (undated) DEVICE — SU PDO 3-0 STRATAFIX 24CM FS/FS REV CUT SXPD2B419

## (undated) DEVICE — BNDG COBAN 6"X5YDS STERILE

## (undated) DEVICE — PREP CHLORAPREP 26ML TINTED ORANGE  260815

## (undated) DEVICE — GOWN XLG DISP 9545

## (undated) DEVICE — GLOVE PROTEXIS W/NEU-THERA 8.0  2D73TE80

## (undated) DEVICE — STOCKING SLEEVE COMPRESSION CALF LG

## (undated) DEVICE — SUCTION IRR SYSTEM W/TIP INTERPULSE

## (undated) DEVICE — DRAPE SHEET REV FOLD 3/4 9349

## (undated) DEVICE — GLOVE PROTEXIS BLUE W/NEU-THERA 8.5  2D73EB85

## (undated) DEVICE — SOL WATER IRRIG 1000ML BOTTLE 07139-09

## (undated) DEVICE — GLOVE PROTEXIS W/NEU-THERA 8.5  2D73TE85

## (undated) DEVICE — BLADE SAW SAGITTAL STRK 21X90X1.19MM HD SYS 6 6221-119-090

## (undated) DEVICE — SU PDO 1 STRATAFIX 36X36CM CTX TAPERPOINT SXPD2B405

## (undated) DEVICE — SU STRATAFIX MONOCRYL 3-0 SPIRAL PS-2 30CM SXMP1B106

## (undated) DEVICE — BONE CEMENT KIT BOWL AND SPATULA STRK 6201-3-410

## (undated) DEVICE — ESU PENCIL W/COATED BLADE E2450H

## (undated) DEVICE — CLOSURE SYS SKIN PREMIERPRO EXOFIN FUSION 4X22CM STRL 3472

## (undated) DEVICE — GLOVE PROTEXIS W/NEU-THERA 7.5  2D73TE75

## (undated) DEVICE — Device

## (undated) DEVICE — BONE CLEANING TIP INTERPULSE  0210-010-000

## (undated) RX ORDER — CEFAZOLIN SODIUM 2 G/100ML
INJECTION, SOLUTION INTRAVENOUS
Status: DISPENSED
Start: 2019-08-12

## (undated) RX ORDER — ROPIVACAINE HYDROCHLORIDE 5 MG/ML
INJECTION, SOLUTION EPIDURAL; INFILTRATION; PERINEURAL
Status: DISPENSED
Start: 2019-08-12

## (undated) RX ORDER — KETAMINE HCL IN 0.9 % NACL 50 MG/5 ML
SYRINGE (ML) INTRAVENOUS
Status: DISPENSED
Start: 2019-08-12

## (undated) RX ORDER — ROPIVACAINE HYDROCHLORIDE 7.5 MG/ML
INJECTION, SOLUTION EPIDURAL; PERINEURAL
Status: DISPENSED
Start: 2019-08-12

## (undated) RX ORDER — GABAPENTIN 100 MG/1
CAPSULE ORAL
Status: DISPENSED
Start: 2019-08-12

## (undated) RX ORDER — PROPOFOL 10 MG/ML
INJECTION, EMULSION INTRAVENOUS
Status: DISPENSED
Start: 2019-08-12

## (undated) RX ORDER — LIDOCAINE HYDROCHLORIDE 10 MG/ML
INJECTION, SOLUTION EPIDURAL; INFILTRATION; INTRACAUDAL; PERINEURAL
Status: DISPENSED
Start: 2019-08-12

## (undated) RX ORDER — DEXAMETHASONE SODIUM PHOSPHATE 10 MG/ML
INJECTION, SOLUTION INTRAMUSCULAR; INTRAVENOUS
Status: DISPENSED
Start: 2019-08-12

## (undated) RX ORDER — KETOROLAC TROMETHAMINE 30 MG/ML
INJECTION, SOLUTION INTRAMUSCULAR; INTRAVENOUS
Status: DISPENSED
Start: 2019-08-12

## (undated) RX ORDER — ONDANSETRON 2 MG/ML
INJECTION INTRAMUSCULAR; INTRAVENOUS
Status: DISPENSED
Start: 2019-08-12

## (undated) RX ORDER — LIDOCAINE HCL/EPINEPHRINE/PF 2%-1:200K
VIAL (ML) INJECTION
Status: DISPENSED
Start: 2019-08-12

## (undated) RX ORDER — EPINEPHRINE 1 MG/ML(1)
AMPUL (ML) INJECTION
Status: DISPENSED
Start: 2019-08-12

## (undated) RX ORDER — FENTANYL CITRATE 50 UG/ML
INJECTION, SOLUTION INTRAMUSCULAR; INTRAVENOUS
Status: DISPENSED
Start: 2019-08-12

## (undated) RX ORDER — EPHEDRINE SULFATE 50 MG/ML
INJECTION, SOLUTION INTRAMUSCULAR; INTRAVENOUS; SUBCUTANEOUS
Status: DISPENSED
Start: 2019-08-12